# Patient Record
Sex: FEMALE | Race: OTHER | HISPANIC OR LATINO | Employment: UNEMPLOYED | URBAN - METROPOLITAN AREA
[De-identification: names, ages, dates, MRNs, and addresses within clinical notes are randomized per-mention and may not be internally consistent; named-entity substitution may affect disease eponyms.]

---

## 2022-06-06 ENCOUNTER — HOSPITAL ENCOUNTER (EMERGENCY)
Facility: HOSPITAL | Age: 22
Discharge: HOME | End: 2022-06-06
Attending: EMERGENCY MEDICINE | Admitting: EMERGENCY MEDICINE

## 2022-06-06 VITALS
OXYGEN SATURATION: 98 % | RESPIRATION RATE: 16 BRPM | TEMPERATURE: 98.4 F | SYSTOLIC BLOOD PRESSURE: 148 MMHG | WEIGHT: 103.7 LBS | HEART RATE: 95 BPM | DIASTOLIC BLOOD PRESSURE: 73 MMHG

## 2022-06-06 DIAGNOSIS — R19.7 NAUSEA VOMITING AND DIARRHEA: Primary | ICD-10-CM

## 2022-06-06 DIAGNOSIS — R11.2 NAUSEA VOMITING AND DIARRHEA: Primary | ICD-10-CM

## 2022-06-06 PROCEDURE — 99283 EMERGENCY DEPT VISIT LOW MDM: CPT

## 2022-06-06 PROCEDURE — 99284 EMERGENCY DEPT VISIT MOD MDM: CPT | Performed by: EMERGENCY MEDICINE

## 2022-06-06 RX ORDER — ONDANSETRON 4 MG/1
4 TABLET, ORALLY DISINTEGRATING ORAL EVERY 8 HOURS PRN
Qty: 20 TABLET | Refills: 0 | Status: SHIPPED | OUTPATIENT
Start: 2022-06-06

## 2022-06-06 RX ORDER — ONDANSETRON 4 MG/1
4 TABLET, ORALLY DISINTEGRATING ORAL ONCE
Status: COMPLETED | OUTPATIENT
Start: 2022-06-06 | End: 2022-06-06

## 2022-06-06 RX ADMIN — ONDANSETRON 4 MG: 4 TABLET, ORALLY DISINTEGRATING ORAL at 12:36

## 2022-06-06 NOTE — ED PROVIDER NOTES
History  Chief Complaint   Patient presents with    Vomiting     5 days vomiting and diarrhea       History provided by:  Patient  Vomiting  Severity:  Moderate  Timing:  Intermittent  Progression:  Worsening  Recent urination:  Normal  Relieved by:  Nothing  Worsened by:  Nothing  Ineffective treatments:  None tried  Associated symptoms: no abdominal pain, no chills, no cough, no diarrhea, no fever, no headaches, no myalgias and no sore throat        None       History reviewed  No pertinent past medical history  History reviewed  No pertinent surgical history  History reviewed  No pertinent family history  I have reviewed and agree with the history as documented  E-Cigarette/Vaping    E-Cigarette Use Never User      E-Cigarette/Vaping Substances     Social History     Tobacco Use    Smoking status: Never Smoker    Smokeless tobacco: Never Used   Vaping Use    Vaping Use: Never used   Substance Use Topics    Drug use: Never       Review of Systems   Constitutional: Negative for chills and fever  HENT: Negative for rhinorrhea, sore throat and trouble swallowing  Eyes: Negative for pain  Respiratory: Negative for cough, shortness of breath, wheezing and stridor  Cardiovascular: Negative for chest pain and leg swelling  Gastrointestinal: Positive for vomiting  Negative for abdominal pain, diarrhea and nausea  Endocrine: Negative for polyuria  Genitourinary: Negative for dysuria, flank pain and urgency  Musculoskeletal: Negative for joint swelling, myalgias and neck stiffness  Skin: Negative for rash  Allergic/Immunologic: Negative for immunocompromised state  Neurological: Negative for dizziness, syncope, weakness, numbness and headaches  Psychiatric/Behavioral: Negative for confusion and suicidal ideas  All other systems reviewed and are negative  Physical Exam  Physical Exam  Vitals and nursing note reviewed  Constitutional:       Appearance: Normal appearance   She is well-developed  HENT:      Head: Normocephalic and atraumatic  Nose: Nose normal       Mouth/Throat:      Mouth: Mucous membranes are moist    Eyes:      Extraocular Movements: Extraocular movements intact  Pupils: Pupils are equal, round, and reactive to light  Cardiovascular:      Rate and Rhythm: Normal rate and regular rhythm  Heart sounds: No murmur heard  No friction rub  Pulmonary:      Effort: No respiratory distress  Breath sounds: Normal breath sounds  No wheezing or rales  Abdominal:      General: Bowel sounds are normal  There is no distension  Palpations: Abdomen is soft  Tenderness: There is no abdominal tenderness  Musculoskeletal:         General: No tenderness  Normal range of motion  Cervical back: Normal range of motion and neck supple  Skin:     General: Skin is warm  Findings: No rash  Neurological:      Mental Status: She is alert and oriented to person, place, and time  Psychiatric:         Mood and Affect: Mood normal          Vital Signs  ED Triage Vitals [06/06/22 1221]   Temperature Pulse Respirations Blood Pressure SpO2   98 4 °F (36 9 °C) 95 16 148/73 98 %      Temp Source Heart Rate Source Patient Position - Orthostatic VS BP Location FiO2 (%)   Oral Monitor Sitting Left arm --      Pain Score       No Pain           Vitals:    06/06/22 1221   BP: 148/73   Pulse: 95   Patient Position - Orthostatic VS: Sitting         Visual Acuity      ED Medications  Medications   ondansetron (ZOFRAN-ODT) dispersible tablet 4 mg (4 mg Oral Given 6/6/22 1236)       Diagnostic Studies  Results Reviewed     None                 No orders to display              Procedures  Procedures         ED Course         CRAFFT    Flowsheet Row Most Recent Value   SBIRT (13-21 yo)    In order to provide better care to our patients, we are screening all of our patients for alcohol and drug use  Would it be okay to ask you these screening questions?  Yes Filed at: 06/06/2022 1237   [facility] Initial Screen: During the past 12 months, did you:    1  Drink any alcohol (more than a few sips)? No Filed at: 06/06/2022 1237   2  Smoke any marijuana or hashish No Filed at: 06/06/2022 1237   3  Use anything else to get high? ("anything else" includes illegal drugs, over the counter and prescription drugs, and things that you sniff or 'barajas')? No Filed at: 06/06/2022 1237                                          MDM  Number of Diagnoses or Management Options  Nausea vomiting and diarrhea: new and requires workup  Diagnosis management comments: 21F with nausea, vomiting and diarrhea  sxs for several weeks  No abdominal pain as well  No tenderness is noted on exam     No signs of dehydration  Will give zofran and outpatient management and f u     Pt re-examined and evaluated after testing and treatment  Spoke with the patient and feeling improved and sxs have resolved  Will discharge home with close f/u with pcp and instructed to return to the ED if sxs worsen or continue  Pt agrees with the plan for discharge and feels comfortable to go home with proper f/u  Advised to return for worsening or additional problems  Diagnostic tests were reviewed and questions answered  Diagnosis, care plan and treatment options were discussed  The patient understand instructions and will follow up as directed  Counseling: I had a detailed discussion with the patient and/or guardian regarding: the historical points, exam findings, and any diagnostic results supporting the discharge diagnosis, lab results, radiology results, discharge instructions reviewed with patient and/or family/caregiver and understanding was verbalized  Instructions given to return to the emergency department if symptoms worsen or persist, or if there are any questions or concerns that arise at home       All labs reviewed and utilized in the medical decision making process    All radiology studies independently viewed by me and interpreted by the radiologist           Disposition  Final diagnoses:   Nausea vomiting and diarrhea     Time reflects when diagnosis was documented in both MDM as applicable and the Disposition within this note     Time User Action Codes Description Comment    6/6/2022 12:53 PM Raymon Edward Add [R11 2,  R19 7] Nausea vomiting and diarrhea       ED Disposition     ED Disposition   Discharge    Condition   Stable    Date/Time   Mon Jun 6, 2022 1253    Comment   Bartolo Santoyo discharge to home/self care  Follow-up Information    None         Patient's Medications   Discharge Prescriptions    ONDANSETRON (ZOFRAN-ODT) 4 MG DISINTEGRATING TABLET    Take 1 tablet (4 mg total) by mouth every 8 (eight) hours as needed for nausea       Start Date: 6/6/2022  End Date: --       Order Dose: 4 mg       Quantity: 20 tablet    Refills: 0       No discharge procedures on file      PDMP Review     None          ED Provider  Electronically Signed by           Ryann Riggs DO  06/06/22 3967

## 2023-06-06 ENCOUNTER — TELEPHONE (OUTPATIENT)
Age: 23
End: 2023-06-06

## 2023-06-14 NOTE — PROGRESS NOTES
"[address]  Yarelis 23 y o  SUBJECTIVE    CC:  \"I'm pregnant\"    HPI: Nancy Lopez is a 23 y o   female presenting today for evaluation of amenorrhea  She reports her LMP was 23  Her periods are a little irregular - monthly, but will change some weeks  She took a home urine pregnancy test end of May  She reports a little nausea, but no vomiting, denies vaginal bleeding or cramping  She was using nothing for contraception prior to LMP  This was a welcome surprise  OBJECTIVE  Vitals:    06/15/23 1403   BP: 101/70   BP Location: Left arm   Patient Position: Standing   Cuff Size: Adult   Pulse: 100   Resp: 18   Weight: 44 5 kg (98 lb)   Height: 5' 1\" (1 549 m)       Physical Exam  Constitutional:       General: She is not in acute distress  Appearance: She is normal weight  She is not ill-appearing or toxic-appearing  Genitourinary:      Vulva normal    HENT:      Head: Normocephalic and atraumatic  Eyes:      Conjunctiva/sclera: Conjunctivae normal    Cardiovascular:      Pulses: Normal pulses  Pulmonary:      Effort: Pulmonary effort is normal  No respiratory distress  Abdominal:      General: There is no distension  Palpations: Abdomen is soft  Tenderness: There is no abdominal tenderness  There is no guarding or rebound  Musculoskeletal:         General: Normal range of motion  Cervical back: Normal range of motion  Right lower leg: No edema  Left lower leg: No edema  Neurological:      General: No focal deficit present  Mental Status: She is alert  Mental status is at baseline  Skin:     General: Skin is warm and dry  Psychiatric:         Mood and Affect: Mood normal          Behavior: Behavior normal    Exam conducted with a chaperone present  Lab Results:   No visits with results within 1 Day(s) from this visit  Latest known visit with results is:   No results found for any previous visit   " IMPRESSION:     Single intrauterine pregnancy of 8 weeks 1 day gestational age  Fetal cardiac activity detected at 178bpm  No pathologic adnexal masses seen, simple left ovarian cyst noted, 2cm at maximum diameter, likely physiologic              ASSESSMENT    Homa Lomax is a 23 y o   with LMP 23, 9w0d by LMP, with ultrasound showing IUP at 8 weeks 1 days  Per ACOG guidelines, will date by ultrasound today  OMARI 24 by first trimester ultrasound, currently at 8 weeks 1 days    PLAN  1  Discussed expectations for pregnancy, including expectations for prenatal visits and ultrasounds  Reviewed that will have visits both with her Ob and with MFM  Discussed that obstetric services are centered at [facility], and that this is where she should expect to deliver  Reviewed briefly that there is a 24/ help line for emergencies and that [facility] will be able to provide ancillary obstetric services for any other emergency care during this pregnancy  2  Return to care for nursing intake and H&P  Prenatal panel, Hep C, and MFM referral placed        All questions were answered & patient expressed understanding      Patient was discussed with Dr Beronica Gordon MD  OBGYN R-4  6/15/2023

## 2023-06-15 ENCOUNTER — ULTRASOUND (OUTPATIENT)
Dept: OBGYN CLINIC | Facility: CLINIC | Age: 23
End: 2023-06-15

## 2023-06-15 VITALS
HEIGHT: 61 IN | WEIGHT: 98 LBS | DIASTOLIC BLOOD PRESSURE: 70 MMHG | SYSTOLIC BLOOD PRESSURE: 101 MMHG | BODY MASS INDEX: 18.5 KG/M2 | RESPIRATION RATE: 18 BRPM | HEART RATE: 100 BPM

## 2023-06-15 DIAGNOSIS — Z34.90 EARLY STAGE OF PREGNANCY: Primary | ICD-10-CM

## 2023-06-15 PROCEDURE — 76817 TRANSVAGINAL US OBSTETRIC: CPT | Performed by: OBSTETRICS & GYNECOLOGY

## 2023-06-15 PROCEDURE — 99213 OFFICE O/P EST LOW 20 MIN: CPT | Performed by: OBSTETRICS & GYNECOLOGY

## 2023-06-21 ENCOUNTER — APPOINTMENT (OUTPATIENT)
Dept: LAB | Facility: CLINIC | Age: 23
End: 2023-06-21

## 2023-06-21 DIAGNOSIS — Z34.90 EARLY STAGE OF PREGNANCY: ICD-10-CM

## 2023-06-21 LAB
ABO GROUP BLD: NORMAL
AMORPH URATE CRY URNS QL MICRO: ABNORMAL
BACTERIA UR QL AUTO: ABNORMAL /HPF
BASOPHILS # BLD AUTO: 0.06 THOUSANDS/ÂΜL (ref 0–0.1)
BASOPHILS NFR BLD AUTO: 1 % (ref 0–1)
BILIRUB UR QL STRIP: NEGATIVE
BLD GP AB SCN SERPL QL: NEGATIVE
CLARITY UR: ABNORMAL
COLOR UR: YELLOW
EOSINOPHIL # BLD AUTO: 0.07 THOUSAND/ÂΜL (ref 0–0.61)
EOSINOPHIL NFR BLD AUTO: 1 % (ref 0–6)
ERYTHROCYTE [DISTWIDTH] IN BLOOD BY AUTOMATED COUNT: 12 % (ref 11.6–15.1)
GLUCOSE UR STRIP-MCNC: NEGATIVE MG/DL
HCT VFR BLD AUTO: 37.8 % (ref 34.8–46.1)
HGB BLD-MCNC: 12.2 G/DL (ref 11.5–15.4)
HGB UR QL STRIP.AUTO: NEGATIVE
IMM GRANULOCYTES # BLD AUTO: 0.04 THOUSAND/UL (ref 0–0.2)
IMM GRANULOCYTES NFR BLD AUTO: 0 % (ref 0–2)
KETONES UR STRIP-MCNC: ABNORMAL MG/DL
LEUKOCYTE ESTERASE UR QL STRIP: ABNORMAL
LYMPHOCYTES # BLD AUTO: 2.12 THOUSANDS/ÂΜL (ref 0.6–4.47)
LYMPHOCYTES NFR BLD AUTO: 24 % (ref 14–44)
MCH RBC QN AUTO: 31 PG (ref 26.8–34.3)
MCHC RBC AUTO-ENTMCNC: 32.3 G/DL (ref 31.4–37.4)
MCV RBC AUTO: 96 FL (ref 82–98)
MONOCYTES # BLD AUTO: 0.47 THOUSAND/ÂΜL (ref 0.17–1.22)
MONOCYTES NFR BLD AUTO: 5 % (ref 4–12)
MUCOUS THREADS UR QL AUTO: ABNORMAL
NEUTROPHILS # BLD AUTO: 6.27 THOUSANDS/ÂΜL (ref 1.85–7.62)
NEUTS SEG NFR BLD AUTO: 69 % (ref 43–75)
NITRITE UR QL STRIP: NEGATIVE
NON-SQ EPI CELLS URNS QL MICRO: ABNORMAL /HPF
NRBC BLD AUTO-RTO: 0 /100 WBCS
PH UR STRIP.AUTO: 7.5 [PH]
PLATELET # BLD AUTO: 242 THOUSANDS/UL (ref 149–390)
PMV BLD AUTO: 12.5 FL (ref 8.9–12.7)
PROT UR STRIP-MCNC: ABNORMAL MG/DL
RBC # BLD AUTO: 3.94 MILLION/UL (ref 3.81–5.12)
RBC #/AREA URNS AUTO: ABNORMAL /HPF
RH BLD: POSITIVE
SP GR UR STRIP.AUTO: 1.02 (ref 1–1.03)
SPECIMEN EXPIRATION DATE: NORMAL
TREPONEMA PALLIDUM IGG+IGM AB [PRESENCE] IN SERUM OR PLASMA BY IMMUNOASSAY: NORMAL
UROBILINOGEN UR STRIP-ACNC: <2 MG/DL
WBC # BLD AUTO: 9.03 THOUSAND/UL (ref 4.31–10.16)
WBC #/AREA URNS AUTO: ABNORMAL /HPF

## 2023-06-21 PROCEDURE — 36415 COLL VENOUS BLD VENIPUNCTURE: CPT

## 2023-06-21 PROCEDURE — 86850 RBC ANTIBODY SCREEN: CPT

## 2023-06-21 PROCEDURE — 87340 HEPATITIS B SURFACE AG IA: CPT

## 2023-06-21 PROCEDURE — 86803 HEPATITIS C AB TEST: CPT

## 2023-06-21 PROCEDURE — 85025 COMPLETE CBC W/AUTO DIFF WBC: CPT

## 2023-06-21 PROCEDURE — 86762 RUBELLA ANTIBODY: CPT

## 2023-06-21 PROCEDURE — 86780 TREPONEMA PALLIDUM: CPT

## 2023-06-21 PROCEDURE — 86900 BLOOD TYPING SEROLOGIC ABO: CPT

## 2023-06-21 PROCEDURE — 87086 URINE CULTURE/COLONY COUNT: CPT

## 2023-06-21 PROCEDURE — 86706 HEP B SURFACE ANTIBODY: CPT

## 2023-06-21 PROCEDURE — 87389 HIV-1 AG W/HIV-1&-2 AB AG IA: CPT

## 2023-06-21 PROCEDURE — 86901 BLOOD TYPING SEROLOGIC RH(D): CPT

## 2023-06-21 PROCEDURE — 81001 URINALYSIS AUTO W/SCOPE: CPT | Performed by: OBSTETRICS & GYNECOLOGY

## 2023-06-22 LAB
HBV SURFACE AB SER-ACNC: 4.51 MIU/ML
HBV SURFACE AG SER QL: NORMAL
HCV AB SER QL: NORMAL
HIV 1+2 AB+HIV1 P24 AG SERPL QL IA: NORMAL
HIV 2 AB SERPL QL IA: NORMAL
HIV1 AB SERPL QL IA: NORMAL
HIV1 P24 AG SERPL QL IA: NORMAL
RUBV IGG SERPL IA-ACNC: <10 IU/ML

## 2023-06-23 LAB — BACTERIA UR CULT: NORMAL

## 2023-06-29 ENCOUNTER — INITIAL PRENATAL (OUTPATIENT)
Dept: OBGYN CLINIC | Facility: CLINIC | Age: 23
End: 2023-06-29

## 2023-06-29 DIAGNOSIS — Z34.91 PRENATAL CARE IN FIRST TRIMESTER: Primary | ICD-10-CM

## 2023-06-29 PROBLEM — Z28.39 RUBELLA NON-IMMUNE STATUS, ANTEPARTUM: Status: ACTIVE | Noted: 2023-06-29

## 2023-06-29 PROBLEM — O09.899 RUBELLA NON-IMMUNE STATUS, ANTEPARTUM: Status: ACTIVE | Noted: 2023-06-29

## 2023-06-29 PROBLEM — Z78.9 NOT IMMUNE TO HEPATITIS B VIRUS: Status: ACTIVE | Noted: 2023-06-29

## 2023-06-29 NOTE — LETTER
Proof of Pregnancy Letter    Miguel Rivera  2000  [address]  [facility] [address]        06/29/23      Miguel Rivera is a patient at our facility  Bartolo Santoyo Estimated Date of Delivery: 1/24/24       Any questions or concerns, please feel free to contact our office      Sincerely,     [facility]   [address],   [place], [address] [place] [address]   [phone]

## 2023-06-29 NOTE — PROGRESS NOTES
OB INTAKE INTERVIEW  Pt presents for OB intake    OB History    Para Term  AB Living   1             SAB IAB Ectopic Multiple Live Births                  # Outcome Date GA Lbr Dylon/2nd Weight Sex Delivery Anes PTL Lv   1 Current              Hx of  delivery prior to 36 weeks 6 days:  No   If yes, place a referral for cervical surveillance at 16 weeks  Last Menstrual Period:    Patient's last menstrual period was 2023 (exact date)  Ultrasound date: 06/15/2023  8 weeks 1 days     Estimated Date of Delivery: 24   Confirmed by LMP or US    H&P visit scheduled  2023      Last pap smear: na    Findings; lab pap smear results: not done, just turned 23    Current Issues:  Constipation :   No  Headaches :   No  Cramping:  No  Spotting :   No  PICA cravings :  No    FOB Involved:   Yes  Planned pregnancy:  Yes    I have these concerns about this prenatal patient:   Intake done in [address] , written materials provided in [address]  Patient currently on SFS financial plan but will be applying for MA  Interview education    • Baby and Me Handout  • Baby and Me [facility] Handout  • [facility]'s MFM Handout  • Discussed genetic testing  • Prenatal lab work: Scripts printed and given to pt  • Influenza vaccine given today: No  • Discussed Tdap vaccine  Immunizations: There is no immunization history on file for this patient  Depression Screening Follow-up Plan: Patient's depression screening was negative with an [place] score of  2  Clinically patient does not have depression  No treatment is required             Infection Screening: Does the pt have a hx of MRSA? No  If yes- please follow MRSA protocol and obtain a nasal swab for MRSA culture    The patient was oriented to our practice and all questions were answered    Interviewed by: Keiry Robles RN 23

## 2023-06-29 NOTE — LETTER
[address] [facility] KATI Santoyo  2000  [address]  [facility] [address]       06/29/23          Nathalie Mauro is a patient and under our care in our office  Bartolo Santoyo's Estimated Date of Delivery: 1/24/24  Any questions or concerns feel free to contact our office       Thank you,    Jacqueline  [id number] [facility]/Vandana Hernandez 15  [address] [facility]/[place]  [phone]   [facility]/[address]  [phone]

## 2023-06-29 NOTE — LETTER
Work Letter    Jayla Aceves  2000  [address]  [facility] [address]    Dear Jayla Aceves,      06/29/23        Your employee is a patient at [facility]  We recommend that all pregnant women:    1  Have a well-ventilated workspace  2  Wear low-heeled shoes  3  Work no more than 40 hours per week  4  Have a 15 minute break every 2 hours and at least 30 minutes for a meal break  5  Use good body mechanics by bending at your knees to avoid back strain and lift no more than 20 pounds without assistance  Will need assistance with lifting over 20 lbs  6  Have ready access to bathrooms and water  She may continue to work until her due date unless medical complications arise  We anticipate she may return to work in 6-8 weeks after delivery       Sincerely,    [facility]  [address]  [place], [address] [place] [address]  [phone]

## 2023-07-03 ENCOUNTER — PATIENT OUTREACH (OUTPATIENT)
Dept: OBGYN CLINIC | Facility: CLINIC | Age: 23
End: 2023-07-03

## 2023-07-03 NOTE — PROGRESS NOTES
NILESH FRYE spoke with 22 y/o-S-G1-  Kinyarwanda speaking woman for pre severiano assessment. Pt resides with her sister. Pt reported pregnancy was intended and both her and FOB are happy. Couple plans to move in together in the near future. Pt denies any usage of drug, alcohol or smoking. Pt denies any Mental Health or Domestic Violence Hx. Pt is applying for MA and need to call [facility] for appointment. Pt is currently unemployed but denies financial concerns. Pt denies transportation issues. Pt claimed FOB ans extended family are supportive. Pt denies any questions or concerns at this time. NILESH FRYE explained her role ans provided contact information. Pt was encouraged to call at any time needed.

## 2023-07-11 ENCOUNTER — ROUTINE PRENATAL (OUTPATIENT)
Dept: OBGYN CLINIC | Facility: CLINIC | Age: 23
End: 2023-07-11

## 2023-07-11 VITALS
HEART RATE: 81 BPM | WEIGHT: 98 LBS | SYSTOLIC BLOOD PRESSURE: 100 MMHG | RESPIRATION RATE: 18 BRPM | BODY MASS INDEX: 18.5 KG/M2 | HEIGHT: 61 IN | DIASTOLIC BLOOD PRESSURE: 66 MMHG

## 2023-07-11 DIAGNOSIS — Z3A.11 11 WEEKS GESTATION OF PREGNANCY: ICD-10-CM

## 2023-07-11 DIAGNOSIS — R82.90 CONTAMINATION OF URINE CULTURE: ICD-10-CM

## 2023-07-11 DIAGNOSIS — Z34.91 FIRST TRIMESTER PREGNANCY: Primary | ICD-10-CM

## 2023-07-11 PROCEDURE — G0145 SCR C/V CYTO,THINLAYER,RESCR: HCPCS | Performed by: NURSE PRACTITIONER

## 2023-07-11 PROCEDURE — 99214 OFFICE O/P EST MOD 30 MIN: CPT | Performed by: NURSE PRACTITIONER

## 2023-07-11 PROCEDURE — 87491 CHLMYD TRACH DNA AMP PROBE: CPT | Performed by: NURSE PRACTITIONER

## 2023-07-11 PROCEDURE — 87591 N.GONORRHOEAE DNA AMP PROB: CPT | Performed by: NURSE PRACTITIONER

## 2023-07-11 NOTE — PROGRESS NOTES
Prenatal H&P  [facility]    Subjective:  Patient is a  here for initial prenatal H&P. This is an intended pregnancy. Patient reports that her LMP was 23. Patient is currently doing well, just nausea . She is here with  boyfriend. . She has good a support system at home, sister, FOB . She does not have a known family history of inheritable conditions such as physical or intellectual disabilities, birth defects, blood disorders, heart or neural tube defects. She denies recent travel. She denies use of nicotine, EtOH or recreational drug use. OB History    Para Term  AB Living   1             SAB IAB Ectopic Multiple Live Births                  # Outcome Date GA Lbr Dylon/2nd Weight Sex Delivery Anes PTL Lv   1 Current                  Objective:   /66 (BP Location: Right arm, Patient Position: Sitting, Cuff Size: Adult)   Pulse 81   Resp 18   Ht 5' 1" (1.549 m)   Wt 44.5 kg (98 lb)   LMP 2023 (Exact Date)   BMI 18.52 kg/m²     General:   alert and oriented, in no acute distress, alert, appears stated age and cooperative   Heart: regular rate and rhythm, S1, S2 normal, no murmur, click, rub or gallop   Lungs: clear to auscultation bilaterally   Abdomen: soft, non-tender, without masses or organomegaly   Vulva: Bartholin's, Urethra, Ryan's normal   Vagina: normal discharge   Cervix: no cervical motion tenderness   Uterus: size consistent with 12 weeks   Adnexa: no mass, fullness, tenderness         Assessment and Plan:  1. LMP 23 with an OMARI 24. 2. TVUS on 6/15/23 showed EGA 8w1d with an OMARI 24, working OMARI, Jose Rafael Edge today  3. Pap smear 1st pap done today  4. GC/CT collected. 5. Prenatal labs- rubella and hep B non immune, urine culture contaminated >100 K, will repeat. 6. Postpartum: patient plans on  breastfeeding.  Different methods of contraception were discussed with patient, including progesterone only oral pills, depo provera, nexplanon, mirena, and paragard. Patient would like to use- unsure  7. Delivery consent form to be signed at 28 weeks. 8. Sequential screenin2023, level 2 US scheduled 23  9. Labor expectations discussed with patient, including appointment schedule, nutrition, weight gain, sexual intercourse, and nausea and vomiting. 25-35 lbs lbs recommended in pregnancy  10. RTC in 4 weeks.  Continue PNV QD.

## 2023-07-12 LAB
C TRACH DNA SPEC QL NAA+PROBE: NEGATIVE
N GONORRHOEA DNA SPEC QL NAA+PROBE: NEGATIVE

## 2023-07-18 ENCOUNTER — ROUTINE PRENATAL (OUTPATIENT)
Facility: HOSPITAL | Age: 23
End: 2023-07-18

## 2023-07-18 VITALS
HEIGHT: 61 IN | DIASTOLIC BLOOD PRESSURE: 58 MMHG | WEIGHT: 99.4 LBS | HEART RATE: 100 BPM | SYSTOLIC BLOOD PRESSURE: 92 MMHG | BODY MASS INDEX: 18.77 KG/M2

## 2023-07-18 DIAGNOSIS — Z34.90 EARLY STAGE OF PREGNANCY: ICD-10-CM

## 2023-07-18 DIAGNOSIS — Z36.82 ENCOUNTER FOR (NT) NUCHAL TRANSLUCENCY SCAN: ICD-10-CM

## 2023-07-18 DIAGNOSIS — Z3A.12 12 WEEKS GESTATION OF PREGNANCY: Primary | ICD-10-CM

## 2023-07-18 LAB
LAB AP GYN PRIMARY INTERPRETATION: NORMAL
Lab: NORMAL
PATH INTERP SPEC-IMP: NORMAL

## 2023-07-18 PROCEDURE — 76813 OB US NUCHAL MEAS 1 GEST: CPT | Performed by: OBSTETRICS & GYNECOLOGY

## 2023-07-18 PROCEDURE — 99242 OFF/OP CONSLTJ NEW/EST SF 20: CPT | Performed by: OBSTETRICS & GYNECOLOGY

## 2023-07-18 PROCEDURE — 36415 COLL VENOUS BLD VENIPUNCTURE: CPT | Performed by: OBSTETRICS & GYNECOLOGY

## 2023-07-18 NOTE — PROGRESS NOTES
Patient chose to have [facility] Non-invasive Prenatal Screen with fetal sex. Patient given brochure and is aware [facility] will contact their insurance and coordinate coverage. Patient made aware she will need to respond to text message or e-mail from [address] within 2 business days or testing will be run through insurance. Patient informed text message will come from area code  "415". Provided [address] # [phone] and web site : "[email or web address].   "[place] your test online" card with barcode and test tube ID provided to patient. Reviewed [employer]'s web site states 5-7 business days for results via their portal.   [employer] message will be sent to patient when [facility] receives results /provider reviews. 2 vials of blood drawn from left arm by Ananda Morgan MA. Patient tolerated blood draw without difficulty. Specimens labeled with patient identifiers (name, date of birth, specimen collection date), order and specimen were verified with patient, packed and sent via [address]. FED EX  tracking #  [id number]  Copy of lab order scanned to Epic media. Maternal Fetal Medicine will have results in approximately 7-10 business days and will call patient or notify via [address] [place] [address]. Patient aware viewing lab result online will reveal fetal sex if ordered. Patient verbalized understanding of all instructions and no questions at this time.

## 2023-07-18 NOTE — PROGRESS NOTES
[address]: Ms. Alexey Mustafa was seen today at [address] [facility] for nuchal translucency ultrasound. See ultrasound report under "OB Procedures" tab. My recommendations are as follows:  1. We reviewed the availability of aneuploidy screening, as well as diagnostic testing, which are available to all pregnant women. We reviewed limitations, risks, and benefits of screening and testing. She elected to proceed with Non Invasive Prenatal Screening (NIPS). MSAFP screening should be ordered through your office at 15-20 weeks gestation, and completed prior to fetal anatomic survey. She does not wish to pursue diagnostic testing at this time. A detailed anatomic survey as well as transvaginal cervical length screening are recommended between 18-22 weeks gestation.       Please don't hesitate to contact our office with any concerns or questions.    -Madelyn Varma MD

## 2023-07-18 NOTE — LETTER
July 18, 2023     Jeanie Jones MD  [address]  [address] [facility] [address] [address]    Patient: Pinky Dobbs   YOB: 2000   Date of Visit: 7/18/2023       Dear Dr. Anatoliy Bennett:    Thank you for referring Pinky Dobbs to me for evaluation. Below are my notes for this consultation. If you have questions, please do not hesitate to call me. I look forward to following your patient along with you.          Sincerely,        Tami Armas MD        CC: No Recipients

## 2023-07-19 ENCOUNTER — TELEPHONE (OUTPATIENT)
Dept: OBGYN CLINIC | Facility: CLINIC | Age: 23
End: 2023-07-19

## 2023-07-19 NOTE — TELEPHONE ENCOUNTER
----- Message from Ralph Horn, [address] [facility] sent at 7/19/2023  9:54 AM EDT -----  Please Inform Candido who is pregnant that her Pap was negative, it suggests possible yeast and BV please inquire if she has any symptoms and let me know.   Thank you

## 2023-07-19 NOTE — TELEPHONE ENCOUNTER
ID [id number]    Called and spoke to patient, informed her of the results. Patient stated she is experiencing vaginal odor, no itchiness.

## 2023-07-20 DIAGNOSIS — Z34.91 FIRST TRIMESTER PREGNANCY: ICD-10-CM

## 2023-07-20 DIAGNOSIS — N76.0 BACTERIAL VAGINOSIS: Primary | ICD-10-CM

## 2023-07-20 DIAGNOSIS — B96.89 BACTERIAL VAGINOSIS: Primary | ICD-10-CM

## 2023-07-20 RX ORDER — METRONIDAZOLE 500 MG/1
500 TABLET ORAL EVERY 12 HOURS SCHEDULED
Qty: 14 TABLET | Refills: 0 | Status: SHIPPED | OUTPATIENT
Start: 2023-07-20 | End: 2023-07-27

## 2023-08-04 ENCOUNTER — TELEPHONE (OUTPATIENT)
Dept: INTERNAL MEDICINE CLINIC | Facility: CLINIC | Age: 23
End: 2023-08-04

## 2023-08-08 ENCOUNTER — ROUTINE PRENATAL (OUTPATIENT)
Dept: OBGYN CLINIC | Facility: CLINIC | Age: 23
End: 2023-08-08

## 2023-08-08 VITALS
SYSTOLIC BLOOD PRESSURE: 108 MMHG | RESPIRATION RATE: 16 BRPM | BODY MASS INDEX: 18.93 KG/M2 | WEIGHT: 100.2 LBS | DIASTOLIC BLOOD PRESSURE: 68 MMHG | HEART RATE: 92 BPM

## 2023-08-08 DIAGNOSIS — R82.90 ABNORMAL URINE FINDINGS: ICD-10-CM

## 2023-08-08 DIAGNOSIS — Z3A.15 15 WEEKS GESTATION OF PREGNANCY: ICD-10-CM

## 2023-08-08 DIAGNOSIS — Z34.92 SECOND TRIMESTER PREGNANCY: Primary | ICD-10-CM

## 2023-08-08 PROCEDURE — 99213 OFFICE O/P EST LOW 20 MIN: CPT | Performed by: FAMILY MEDICINE

## 2023-08-08 NOTE — PATIENT INSTRUCTIONS
Return for follow-up in 4 weeks. We have ordered 2 tests: a urine culture and an alpha-feto protein (MSAFP) to check for spina bifida in your baby. It is important for you to get your Hepatitis B vaccine as soon as possible.

## 2023-08-08 NOTE — PROGRESS NOTES
[facility]  OB/GYN prenatal visit    S: 26 y.o.  15w6d here for PN visit. She has no complaints. O:  Vitals:    23 1306   BP: 108/68   Pulse: 92   Resp: 16       Gen: no acute distress, nonlabored breathing          A/P:      IUP at 15w6d  · No obstetric complaints today, no genitourinary complaints  · TWG: + .95 kg ( 25-35 lbs recommended in pregnancy)  · Repeat urine culture (last contaminated >100 K)  · MSAFP: parents would like to screen  · NIPS reviewed: negative screen  · Vaccinations: Rubella and Hep non immune. Hep B - referral to FM was placed; reminded patient our recommendations to get this done soon. MMR postpartum.   · Contraception: undecided at this time  Breastfeeding: plans  · Birth plan: primigravid, discussed elective induction  · Discussed  labor precautions and fetal kick counts  · FHT: 150 via TAUS    · Continue PNV  ·  ultrasound scheduled 23  · Return to clinic in 4 weeks      Jaci Goldberg MD  2023  1:44 PM

## 2023-08-09 ENCOUNTER — APPOINTMENT (OUTPATIENT)
Dept: LAB | Facility: CLINIC | Age: 23
End: 2023-08-09
Payer: COMMERCIAL

## 2023-08-09 DIAGNOSIS — Z34.92 SECOND TRIMESTER PREGNANCY: ICD-10-CM

## 2023-08-09 DIAGNOSIS — Z3A.15 15 WEEKS GESTATION OF PREGNANCY: ICD-10-CM

## 2023-08-09 DIAGNOSIS — Z3A.11 11 WEEKS GESTATION OF PREGNANCY: ICD-10-CM

## 2023-08-09 DIAGNOSIS — R82.90 CONTAMINATION OF URINE CULTURE: ICD-10-CM

## 2023-08-09 DIAGNOSIS — R82.90 ABNORMAL URINE FINDINGS: ICD-10-CM

## 2023-08-09 PROCEDURE — 87086 URINE CULTURE/COLONY COUNT: CPT

## 2023-08-09 PROCEDURE — 36415 COLL VENOUS BLD VENIPUNCTURE: CPT

## 2023-08-09 PROCEDURE — 82105 ALPHA-FETOPROTEIN SERUM: CPT

## 2023-08-10 LAB — BACTERIA UR CULT: NORMAL

## 2023-08-11 LAB
2ND TRIMESTER 4 SCREEN SERPL-IMP: NORMAL
AFP ADJ MOM SERPL: 0.59
AFP INTERP AMN-IMP: NORMAL
AFP INTERP SERPL-IMP: NORMAL
AFP INTERP SERPL-IMP: NORMAL
AFP SERPL-MCNC: 66.5 NG/ML
AGE AT DELIVERY: 21.4 YR
GA METHOD: NORMAL
GA: 22.9 WEEKS
IDDM PATIENT QL: NO
MULTIPLE PREGNANCY: NO
NEURAL TUBE DEFECT RISK FETUS: NORMAL %

## 2023-08-14 ENCOUNTER — TELEPHONE (OUTPATIENT)
Dept: OBGYN CLINIC | Facility: CLINIC | Age: 23
End: 2023-08-14

## 2023-08-14 NOTE — TELEPHONE ENCOUNTER
----- Message from Tyrell Mike MD sent at 8/14/2023  8:51 AM EDT -----  MSAFP and repeat ur cx negative. My chart msg sent.

## 2023-08-14 NOTE — TELEPHONE ENCOUNTER
ID [id number]    Attempted to call and spoke to patient, referred her to my chart for recent test results. Office number provided with any questions.

## 2023-08-15 PROCEDURE — 99284 EMERGENCY DEPT VISIT MOD MDM: CPT

## 2023-08-15 PROCEDURE — 99285 EMERGENCY DEPT VISIT HI MDM: CPT | Performed by: EMERGENCY MEDICINE

## 2023-08-16 ENCOUNTER — HOSPITAL ENCOUNTER (EMERGENCY)
Facility: HOSPITAL | Age: 23
Discharge: HOME | End: 2023-08-16
Attending: EMERGENCY MEDICINE
Payer: COMMERCIAL

## 2023-08-16 ENCOUNTER — TELEPHONE (OUTPATIENT)
Dept: OBGYN CLINIC | Facility: CLINIC | Age: 23
End: 2023-08-16

## 2023-08-16 VITALS
DIASTOLIC BLOOD PRESSURE: 67 MMHG | OXYGEN SATURATION: 99 % | TEMPERATURE: 99.1 F | HEART RATE: 99 BPM | RESPIRATION RATE: 16 BRPM | SYSTOLIC BLOOD PRESSURE: 113 MMHG

## 2023-08-16 DIAGNOSIS — R10.13 EPIGASTRIC PAIN: Primary | ICD-10-CM

## 2023-08-16 LAB
ALBUMIN SERPL BCP-MCNC: 3.8 G/DL (ref 3.5–5)
ALP SERPL-CCNC: 45 U/L (ref 34–104)
ALT SERPL W P-5'-P-CCNC: 28 U/L (ref 7–52)
ANION GAP SERPL CALCULATED.3IONS-SCNC: 9 MMOL/L
AST SERPL W P-5'-P-CCNC: 26 U/L (ref 13–39)
ATRIAL RATE: 98 BPM
BASOPHILS # BLD AUTO: 0.05 THOUSANDS/ÂΜL (ref 0–0.1)
BASOPHILS NFR BLD AUTO: 1 % (ref 0–1)
BILIRUB SERPL-MCNC: 0.28 MG/DL (ref 0.2–1)
BUN SERPL-MCNC: 8 MG/DL (ref 5–25)
CALCIUM SERPL-MCNC: 8.8 MG/DL (ref 8.4–10.2)
CARDIAC TROPONIN I PNL SERPL HS: 2 NG/L (ref 8–18)
CHLORIDE SERPL-SCNC: 103 MMOL/L (ref 96–108)
CO2 SERPL-SCNC: 23 MMOL/L (ref 21–32)
CREAT SERPL-MCNC: 0.6 MG/DL (ref 0.6–1.3)
EOSINOPHIL # BLD AUTO: 0.07 THOUSAND/ÂΜL (ref 0–0.61)
EOSINOPHIL NFR BLD AUTO: 1 % (ref 0–6)
ERYTHROCYTE [DISTWIDTH] IN BLOOD BY AUTOMATED COUNT: 12.9 % (ref 11.6–15.1)
GFR SERPL CREATININE-BSD FRML MDRD: 130 ML/MIN/1.73SQ M
GLUCOSE SERPL-MCNC: 113 MG/DL (ref 65–140)
HCT VFR BLD AUTO: 32.9 % (ref 34.8–46.1)
HGB BLD-MCNC: 11.1 G/DL (ref 11.5–15.4)
IMM GRANULOCYTES # BLD AUTO: 0.06 THOUSAND/UL (ref 0–0.2)
IMM GRANULOCYTES NFR BLD AUTO: 1 % (ref 0–2)
LIPASE SERPL-CCNC: 30 U/L (ref 11–82)
LYMPHOCYTES # BLD AUTO: 2.8 THOUSANDS/ÂΜL (ref 0.6–4.47)
LYMPHOCYTES NFR BLD AUTO: 26 % (ref 14–44)
MCH RBC QN AUTO: 31.8 PG (ref 26.8–34.3)
MCHC RBC AUTO-ENTMCNC: 33.7 G/DL (ref 31.4–37.4)
MCV RBC AUTO: 94 FL (ref 82–98)
MONOCYTES # BLD AUTO: 0.77 THOUSAND/ÂΜL (ref 0.17–1.22)
MONOCYTES NFR BLD AUTO: 7 % (ref 4–12)
NEUTROPHILS # BLD AUTO: 7.04 THOUSANDS/ÂΜL (ref 1.85–7.62)
NEUTS SEG NFR BLD AUTO: 64 % (ref 43–75)
NRBC BLD AUTO-RTO: 0 /100 WBCS
P AXIS: 71 DEGREES
PLATELET # BLD AUTO: 208 THOUSANDS/UL (ref 149–390)
PMV BLD AUTO: 11.1 FL (ref 8.9–12.7)
POTASSIUM SERPL-SCNC: 3.1 MMOL/L (ref 3.5–5.3)
PR INTERVAL: 126 MS
PROT SERPL-MCNC: 6.7 G/DL (ref 6.4–8.4)
QRS AXIS: 54 DEGREES
QRSD INTERVAL: 70 MS
QT INTERVAL: 340 MS
QTC INTERVAL: 434 MS
RBC # BLD AUTO: 3.49 MILLION/UL (ref 3.81–5.12)
SODIUM SERPL-SCNC: 135 MMOL/L (ref 135–147)
T WAVE AXIS: 38 DEGREES
VENTRICULAR RATE: 98 BPM
WBC # BLD AUTO: 10.79 THOUSAND/UL (ref 4.31–10.16)

## 2023-08-16 PROCEDURE — 93010 ELECTROCARDIOGRAM REPORT: CPT | Performed by: INTERNAL MEDICINE

## 2023-08-16 PROCEDURE — 36415 COLL VENOUS BLD VENIPUNCTURE: CPT | Performed by: EMERGENCY MEDICINE

## 2023-08-16 PROCEDURE — 83690 ASSAY OF LIPASE: CPT | Performed by: EMERGENCY MEDICINE

## 2023-08-16 PROCEDURE — C9113 INJ PANTOPRAZOLE SODIUM, VIA: HCPCS | Performed by: EMERGENCY MEDICINE

## 2023-08-16 PROCEDURE — 93005 ELECTROCARDIOGRAM TRACING: CPT

## 2023-08-16 PROCEDURE — 85025 COMPLETE CBC W/AUTO DIFF WBC: CPT | Performed by: EMERGENCY MEDICINE

## 2023-08-16 PROCEDURE — 84484 ASSAY OF TROPONIN QUANT: CPT | Performed by: EMERGENCY MEDICINE

## 2023-08-16 PROCEDURE — 80053 COMPREHEN METABOLIC PANEL: CPT | Performed by: EMERGENCY MEDICINE

## 2023-08-16 PROCEDURE — 96374 THER/PROPH/DIAG INJ IV PUSH: CPT

## 2023-08-16 RX ORDER — MAGNESIUM HYDROXIDE/ALUMINUM HYDROXICE/SIMETHICONE 120; 1200; 1200 MG/30ML; MG/30ML; MG/30ML
30 SUSPENSION ORAL ONCE
Status: COMPLETED | OUTPATIENT
Start: 2023-08-16 | End: 2023-08-16

## 2023-08-16 RX ORDER — POTASSIUM CHLORIDE 20 MEQ/1
40 TABLET, EXTENDED RELEASE ORAL ONCE
Status: COMPLETED | OUTPATIENT
Start: 2023-08-16 | End: 2023-08-16

## 2023-08-16 RX ORDER — PANTOPRAZOLE SODIUM 40 MG/10ML
40 INJECTION, POWDER, LYOPHILIZED, FOR SOLUTION INTRAVENOUS ONCE
Status: COMPLETED | OUTPATIENT
Start: 2023-08-16 | End: 2023-08-16

## 2023-08-16 RX ORDER — PANTOPRAZOLE SODIUM 40 MG/1
40 TABLET, DELAYED RELEASE ORAL DAILY
Qty: 10 TABLET | Refills: 0 | Status: SHIPPED | OUTPATIENT
Start: 2023-08-16 | End: 2023-08-26

## 2023-08-16 RX ADMIN — PANTOPRAZOLE SODIUM 40 MG: 40 INJECTION, POWDER, FOR SOLUTION INTRAVENOUS at 00:21

## 2023-08-16 RX ADMIN — POTASSIUM CHLORIDE 40 MEQ: 1500 TABLET, EXTENDED RELEASE ORAL at 01:16

## 2023-08-16 RX ADMIN — ALUMINUM HYDROXIDE, MAGNESIUM HYDROXIDE, AND DIMETHICONE 30 ML: 200; 20; 200 SUSPENSION ORAL at 00:21

## 2023-08-16 NOTE — TELEPHONE ENCOUNTER
ID [id number]    Called and spoke to patient, informed her of the test results. Patient verbalized understanding, no questions or concerns.

## 2023-08-16 NOTE — TELEPHONE ENCOUNTER
----- Message from Janey Reyna, [address] [facility] sent at 8/16/2023  8:53 AM EDT -----  Please inform pt that her urine culture was negative.   Thanks

## 2023-08-19 NOTE — ED ATTENDING ATTESTATION
8/15/2023  June Elizalde DO, saw and evaluated the patient. I have discussed the patient with the resident/non-physician practitioner and agree with the resident's/non-physician practitioner's findings, Plan of Care, and MDM as documented in the resident's/non-physician practitioner's note, except where noted. All available labs and Radiology studies were reviewed. I was present for key portions of any procedure(s) performed by the resident/non-physician practitioner and I was immediately available to provide assistance. At this point I agree with the current assessment done in the Emergency Department.   I have conducted an independent evaluation of this patient a history and physical is as follows:    ED Course         Critical Care Time  Procedures

## 2023-09-04 PROBLEM — Z3A.19 19 WEEKS GESTATION OF PREGNANCY: Status: ACTIVE | Noted: 2023-07-11

## 2023-09-04 NOTE — PROGRESS NOTES
[facility]  OB/GYN prenatal visit    S: 26 y.o.  19w5d here for PN visit. She has no obstetric complaints, including pelvic pain, contractions, vaginal bleeding, loss of fluid, or decreased fetal movement. She reports she had some cramping last week does not have currently. May have been dehydrated, reviewed water intake and to increase. She was seen in ER on 8/15/2023 for chest pain/epigastric pain was treated with Protonix, was given a prescription for home but patient did not fill, reports she does not want to take it and that she did not need it anymore. Did review over-the-counter medications if needed and to call with any concerns.   She was also given potassium in the ER due to her level being 3.1, also reviewed increase her diet with foods that are high in potassium    O:  Vitals:    23 1001   BP: 95/64   Pulse: 76   Resp: 18       Gen: no acute distress, nonlabored breathing  Fundal Height (cm): 21 cm  Fetal Heart Rate: 147    A/P:      IUP at 19w5d  No obstetric complaints today  TWG: + 5 lbs 14.3 oz  ( 25-35 lbs recommended in pregnancy)  US 23- level 2 scheduled for 23  NIPT negative  MSAFP- negative  Discussed  labor precautions and fetal kick counts    Return to clinic in 4 weeks      Problem List        Other    Rubella non-immune status, antepartum    Overview     Noted on prenatal labs  Needs MMR postpartum         Not immune to hepatitis B virus    Overview     Noted on prenatal labs, vaccine series recommended to be obtained through PCP         Second trimester pregnancy    19 weeks gestation of pregnancy    Overview      pregravid BMI 18.55-    25-35 lbs recommended in pregnancy  Rubella non immune,   Hepatitis B non immune  Urine culture contaminated repeat done 23- negative  NIPT negative, MSAFP negative  US 23- level 2 scheduled for 23  Contraception- unsure  Covid vaccines-x2  Contraception-unsure         Abnormal urine findings Overview     6/21/13 mixed contaminants >100,000 urine culture  7/11/13 ordered but pt did not complete  8/8/13 seen for PN and ordered urine culture- negative                  WANDY Ho  9/5/2023  10:39 AM

## 2023-09-05 ENCOUNTER — ROUTINE PRENATAL (OUTPATIENT)
Dept: OBGYN CLINIC | Facility: CLINIC | Age: 23
End: 2023-09-05

## 2023-09-05 VITALS
WEIGHT: 104 LBS | BODY MASS INDEX: 19.63 KG/M2 | DIASTOLIC BLOOD PRESSURE: 64 MMHG | RESPIRATION RATE: 18 BRPM | SYSTOLIC BLOOD PRESSURE: 95 MMHG | HEIGHT: 61 IN | HEART RATE: 76 BPM

## 2023-09-05 DIAGNOSIS — Z34.92 SECOND TRIMESTER PREGNANCY: Primary | ICD-10-CM

## 2023-09-05 DIAGNOSIS — Z3A.19 19 WEEKS GESTATION OF PREGNANCY: ICD-10-CM

## 2023-09-06 ENCOUNTER — ROUTINE PRENATAL (OUTPATIENT)
Facility: HOSPITAL | Age: 23
End: 2023-09-06
Payer: COMMERCIAL

## 2023-09-06 VITALS
DIASTOLIC BLOOD PRESSURE: 58 MMHG | WEIGHT: 104.72 LBS | HEART RATE: 83 BPM | SYSTOLIC BLOOD PRESSURE: 102 MMHG | HEIGHT: 61 IN | BODY MASS INDEX: 19.77 KG/M2

## 2023-09-06 DIAGNOSIS — Z36.86 ENCOUNTER FOR ANTENATAL SCREENING FOR CERVICAL LENGTH: ICD-10-CM

## 2023-09-06 DIAGNOSIS — Z3A.20 20 WEEKS GESTATION OF PREGNANCY: Primary | ICD-10-CM

## 2023-09-06 DIAGNOSIS — Z36.3 ENCOUNTER FOR ANTENATAL SCREENING FOR MALFORMATION: ICD-10-CM

## 2023-09-06 PROCEDURE — 76805 OB US >/= 14 WKS SNGL FETUS: CPT | Performed by: OBSTETRICS & GYNECOLOGY

## 2023-09-06 PROCEDURE — 76817 TRANSVAGINAL US OBSTETRIC: CPT | Performed by: OBSTETRICS & GYNECOLOGY

## 2023-09-06 NOTE — PROGRESS NOTES
Ultrasound Probe Disinfection    A transvaginal ultrasound was performed. Prior to use, disinfection was performed with High Level Disinfection Process ([employer]). Probe serial number [id number]: [id number] was used.       Keerthi oVra  09/06/23  1:32 PM

## 2023-09-06 NOTE — PATIENT INSTRUCTIONS
Thank you for choosing us for your  care today. If you have any questions about your ultrasound or care, please do not hesitate to contact us or your primary obstetrician. Some general instructions for your pregnancy are:    Protect against coronavirus: get vaccinated - pregnant women are increased risk of severe COVID. Notify your primary care doctor if you have any symptoms. Exercise: Aim for 22 minutes per day (150 minutes per week) of regular exercise. Walking is great! Nutrition: aim for calcium-rich and iron-rich foods as well as healthy sources of protein. Learn about Preeclampsia: preeclampsia is a common, serious high blood pressure complication in pregnancy. A blood pressure of 840CDSJ (systolic or top number) or 97YLZB (diastolic or bottom number) is not normal and needs evaluation by your doctor. Aspirin is sometimes prescribed in early pregnancy to prevent preeclampsia in women with risk factors - ask your obstetrician if you should be on this medication. If you smoke, try to reduce how many cigarettes you smoke or try to quit completely. Do not vape. Other warning signs to watch out for in pregnancy or postpartum: chest pain, obstructed breathing or shortness of breath, seizures, thoughts of hurting yourself or your baby, bleeding, a painful or swollen leg, fever, or headache (see [facility] Sign[facility]). If these happen call 911. Itching is also not normal in pregnancy and if you experience this, especially over your hands and feet, potentially worse at night, notify your doctors.

## 2023-09-06 NOTE — PROGRESS NOTES
[address]: [address] was seen today for anatomic survey and cervical length screening ultrasound. See ultrasound report under "OB Procedures" tab.    Please don't hesitate to contact our office with any concerns or questions.  -Katherine Merida MD

## 2023-10-03 ENCOUNTER — ROUTINE PRENATAL (OUTPATIENT)
Dept: OBGYN CLINIC | Facility: CLINIC | Age: 23
End: 2023-10-03

## 2023-10-03 VITALS
HEART RATE: 74 BPM | BODY MASS INDEX: 21.52 KG/M2 | HEIGHT: 61 IN | RESPIRATION RATE: 18 BRPM | DIASTOLIC BLOOD PRESSURE: 61 MMHG | WEIGHT: 114 LBS | SYSTOLIC BLOOD PRESSURE: 96 MMHG

## 2023-10-03 DIAGNOSIS — Z3A.23 23 WEEKS GESTATION OF PREGNANCY: ICD-10-CM

## 2023-10-03 DIAGNOSIS — Z34.92 SECOND TRIMESTER PREGNANCY: Primary | ICD-10-CM

## 2023-10-03 PROCEDURE — 99213 OFFICE O/P EST LOW 20 MIN: CPT | Performed by: NURSE PRACTITIONER

## 2023-10-03 NOTE — PROGRESS NOTES
[facility]  OB/GYN prenatal visit    S: 26 y.o.  23w6d here for PN visit. She has no obstetric complaints, including pelvic pain, contractions, vaginal bleeding, loss of fluid, or decreased fetal movement.       O:  Vitals:    10/03/23 1006   BP: 96/61   Pulse: 74   Resp: 18       Gen: no acute distress, nonlabored breathing  Fundal Height (cm): 25 cm  Fetal Heart Rate: 144    A/P:      IUP at 23w6d  No obstetric complaints today  TWG: + 15 lbs 14.3 ( 25-35 lbs  Recommended in pregnancy)  US 23-US 10/6/23 to complete anatomy survey  To complete 28 wk labs a few days prior to her next appointment, B+ blood type  Vaccinations: Declines flu vaccine today, information provided  Contraception: condoms  Breastfeeding: yes  Discussed  labor precautions and fetal kick counts    Return to clinic in 4 weeks    Problem List        Other    Rubella non-immune status, antepartum    Overview     Noted on prenatal labs  Needs MMR postpartum         Not immune to hepatitis B virus    Overview     Noted on prenatal labs, vaccine series recommended to be obtained through PCP         Second trimester pregnancy    23 weeks gestation of pregnancy    Overview      pregravid BMI 18.55-    25-35 lbs recommended in pregnancy  Rubella non immune,   Hepatitis B non immune   Urine culture contaminated repeat done 23- negative  NIPT negative, MSAFP negative  US 23- level 2 scheduled for 23- US 10/6/23 to complete anatomy scan  breastfeeding  Contraception- condoms  Covid vaccines-x2           Abnormal urine findings    Overview     13 mixed contaminants >100,000 urine culture  13 ordered but pt did not complete  13 seen for PN and ordered urine culture- negative                  WANDY Toledo  10/3/2023  10:52 AM

## 2023-10-06 ENCOUNTER — ULTRASOUND (OUTPATIENT)
Dept: PERINATAL CARE | Facility: CLINIC | Age: 23
End: 2023-10-06
Payer: COMMERCIAL

## 2023-10-06 VITALS
WEIGHT: 112.8 LBS | SYSTOLIC BLOOD PRESSURE: 95 MMHG | DIASTOLIC BLOOD PRESSURE: 60 MMHG | HEIGHT: 61 IN | BODY MASS INDEX: 21.3 KG/M2 | HEART RATE: 105 BPM

## 2023-10-06 DIAGNOSIS — Z3A.24 24 WEEKS GESTATION OF PREGNANCY: ICD-10-CM

## 2023-10-06 DIAGNOSIS — Z36.2 ENCOUNTER FOR FOLLOW-UP ULTRASOUND OF FETAL ANATOMY: Primary | ICD-10-CM

## 2023-10-06 PROCEDURE — 76816 OB US FOLLOW-UP PER FETUS: CPT | Performed by: OBSTETRICS & GYNECOLOGY

## 2023-10-06 NOTE — PATIENT INSTRUCTIONS
Thank you for choosing us for your  care today. If you have any questions about your ultrasound or care, please do not hesitate to contact us or your primary obstetrician. Some general instructions for your pregnancy are:    Exercise: Aim for 22 minutes per day (150 minutes per week) of regular exercise. Walking is great! Nutrition: Choose healthy sources of calcium, iron, and protein. Learn about Preeclampsia: preeclampsia is a common, serious high blood pressure complication in pregnancy. A blood pressure of 064YGWQ (systolic or top number) or 32AKCN (diastolic or bottom number) is not normal and needs evaluation by your doctor. Aspirin is sometimes prescribed in early pregnancy to prevent preeclampsia in women with risk factors - ask your obstetrician if you should be on this medication. For more resources, visit:  [employer]  If you smoke, try to reduce how many cigarettes you smoke or try to quit completely. Do not vape. Other warning signs to watch out for in pregnancy or postpartum: chest pain, obstructed breathing or shortness of breath, seizures, thoughts of hurting yourself or your baby, bleeding, a painful or swollen leg, fever, or headache (see [facility] POST-BIRTH Warning Signs campaign). If these happen call 911. Itching is also not normal in pregnancy and if you experience this, especially over your hands and feet, potentially worse at night, notify your doctors.

## 2023-10-06 NOTE — PROGRESS NOTES
[id number]  [facility]: Ms. Julio C Dutta was seen today for followup missed anatomy ultrasound. See ultrasound report under "OB Procedures" tab. Please don't hesitate to contact our office with any concerns or questions.   Bruce Whiting MD

## 2023-10-27 ENCOUNTER — APPOINTMENT (OUTPATIENT)
Dept: LAB | Facility: CLINIC | Age: 23
End: 2023-10-27
Payer: COMMERCIAL

## 2023-10-27 DIAGNOSIS — Z34.92 SECOND TRIMESTER PREGNANCY: ICD-10-CM

## 2023-10-27 LAB
BASOPHILS # BLD AUTO: 0.07 THOUSANDS/ÂΜL (ref 0–0.1)
BASOPHILS NFR BLD AUTO: 1 % (ref 0–1)
EOSINOPHIL # BLD AUTO: 0.13 THOUSAND/ÂΜL (ref 0–0.61)
EOSINOPHIL NFR BLD AUTO: 1 % (ref 0–6)
ERYTHROCYTE [DISTWIDTH] IN BLOOD BY AUTOMATED COUNT: 12.1 % (ref 11.6–15.1)
HCT VFR BLD AUTO: 32.9 % (ref 34.8–46.1)
HGB BLD-MCNC: 10.8 G/DL (ref 11.5–15.4)
IMM GRANULOCYTES # BLD AUTO: 0.13 THOUSAND/UL (ref 0–0.2)
IMM GRANULOCYTES NFR BLD AUTO: 1 % (ref 0–2)
LYMPHOCYTES # BLD AUTO: 2.59 THOUSANDS/ÂΜL (ref 0.6–4.47)
LYMPHOCYTES NFR BLD AUTO: 24 % (ref 14–44)
MCH RBC QN AUTO: 32.2 PG (ref 26.8–34.3)
MCHC RBC AUTO-ENTMCNC: 32.8 G/DL (ref 31.4–37.4)
MCV RBC AUTO: 98 FL (ref 82–98)
MONOCYTES # BLD AUTO: 0.83 THOUSAND/ÂΜL (ref 0.17–1.22)
MONOCYTES NFR BLD AUTO: 8 % (ref 4–12)
NEUTROPHILS # BLD AUTO: 7.08 THOUSANDS/ÂΜL (ref 1.85–7.62)
NEUTS SEG NFR BLD AUTO: 65 % (ref 43–75)
NRBC BLD AUTO-RTO: 0 /100 WBCS
PLATELET # BLD AUTO: 233 THOUSANDS/UL (ref 149–390)
PMV BLD AUTO: 11.5 FL (ref 8.9–12.7)
RBC # BLD AUTO: 3.35 MILLION/UL (ref 3.81–5.12)
TREPONEMA PALLIDUM IGG+IGM AB [PRESENCE] IN SERUM OR PLASMA BY IMMUNOASSAY: NORMAL
WBC # BLD AUTO: 10.83 THOUSAND/UL (ref 4.31–10.16)

## 2023-10-27 PROCEDURE — 86780 TREPONEMA PALLIDUM: CPT

## 2023-10-27 PROCEDURE — 36415 COLL VENOUS BLD VENIPUNCTURE: CPT

## 2023-10-30 ENCOUNTER — APPOINTMENT (OUTPATIENT)
Dept: LAB | Facility: CLINIC | Age: 23
End: 2023-10-30
Payer: COMMERCIAL

## 2023-10-30 LAB — GLUCOSE 1H P 50 G GLC PO SERPL-MCNC: 103 MG/DL (ref 40–134)

## 2023-10-31 ENCOUNTER — ROUTINE PRENATAL (OUTPATIENT)
Dept: OBGYN CLINIC | Facility: CLINIC | Age: 23
End: 2023-10-31

## 2023-10-31 VITALS
HEIGHT: 61 IN | BODY MASS INDEX: 22.28 KG/M2 | SYSTOLIC BLOOD PRESSURE: 95 MMHG | WEIGHT: 118 LBS | HEART RATE: 84 BPM | DIASTOLIC BLOOD PRESSURE: 61 MMHG | RESPIRATION RATE: 18 BRPM

## 2023-10-31 DIAGNOSIS — Z3A.27 27 WEEKS GESTATION OF PREGNANCY: ICD-10-CM

## 2023-10-31 DIAGNOSIS — O99.013 ANEMIA DURING PREGNANCY IN THIRD TRIMESTER: ICD-10-CM

## 2023-10-31 DIAGNOSIS — Z34.92 SECOND TRIMESTER PREGNANCY: Primary | ICD-10-CM

## 2023-10-31 RX ORDER — FERROUS SULFATE 324(65)MG
324 TABLET, DELAYED RELEASE (ENTERIC COATED) ORAL
Qty: 30 TABLET | Refills: 2 | Status: SHIPPED | OUTPATIENT
Start: 2023-10-31 | End: 2024-01-31

## 2023-10-31 RX ORDER — DOCUSATE SODIUM 100 MG/1
100 CAPSULE, LIQUID FILLED ORAL 2 TIMES DAILY
Qty: 60 CAPSULE | Refills: 2 | Status: SHIPPED | OUTPATIENT
Start: 2023-10-31 | End: 2024-01-31

## 2023-10-31 NOTE — PROGRESS NOTES
[facility]  OB/GYN prenatal visit    S: 26 y.o.  27w6d here for PN visit. She has no obstetric complaints, including pelvic pain, contractions, vaginal bleeding, loss of fluid, or decreased fetal movement. O:  Vitals:    10/31/23 0957   BP: 95/61   Pulse: 84   Resp: 18       Gen: no acute distress, nonlabored breathing  Fundal Height (cm): 28 cm  Fetal Heart Rate: 144    A/P:      IUP at 27w6d  No obstetric complaints today  TWG: + 19lbs 14.3    US 10/6/23, VTX,   10/30/23   1 hr , hgb 10.8, will start on iron supplements  Vaccinations: flu and Tdap next visit  Reviewed RSV vaccine and information provided  Delivery consent signed today  Discussed  labor precautions and fetal kick counts    Return to clinic in 2 weeks    Problem List          Other    Rubella non-immune status, antepartum    Overview     Noted on prenatal labs  Needs MMR postpartum         Not immune to hepatitis B virus    Overview     Noted on prenatal labs, vaccine series recommended to be obtained through PCP         Second trimester pregnancy    27 weeks gestation of pregnancy    Overview      pregravid BMI 18.55-    25-35 lbs recommended in pregnancy  Rubella non immune,   Hepatitis B non immune   Urine culture contaminated repeat done 23- negative  NIPT negative, MSAFP negative  US 23- level 2 scheduled for 23- US 10/6/23 to complete anatomy scan, US 10/30/23, VTX no further US  Delivery consent signed 10/31/23  breastfeeding  Contraception- condoms  Covid vaccines-x2  Flu and Tdap vaccine next visit. Reviewed RSV vaccine ( 32-36 wks) information provided.            Abnormal urine findings    Overview     23 mixed contaminants >100,000 urine culture  23 ordered but pt did not complete  23 seen for PN and ordered urine culture- negative           Anemia during pregnancy in third trimester    Overview     Lab Results   Component Value Date    HGB 10.8 (L) 10/27/2023     Will start on iron supplements once a day and colace             WANDY Mir  10/31/2023  10:31 AM

## 2023-11-14 ENCOUNTER — ROUTINE PRENATAL (OUTPATIENT)
Dept: OBGYN CLINIC | Facility: CLINIC | Age: 23
End: 2023-11-14

## 2023-11-14 VITALS
SYSTOLIC BLOOD PRESSURE: 100 MMHG | BODY MASS INDEX: 22.84 KG/M2 | HEIGHT: 61 IN | DIASTOLIC BLOOD PRESSURE: 75 MMHG | RESPIRATION RATE: 18 BRPM | WEIGHT: 121 LBS | HEART RATE: 80 BPM

## 2023-11-14 DIAGNOSIS — Z23 ENCOUNTER FOR IMMUNIZATION: Primary | ICD-10-CM

## 2023-11-14 PROBLEM — Z3A.29 29 WEEKS GESTATION OF PREGNANCY: Status: ACTIVE | Noted: 2023-07-11

## 2023-11-14 PROCEDURE — 90471 IMMUNIZATION ADMIN: CPT | Performed by: OBSTETRICS & GYNECOLOGY

## 2023-11-14 PROCEDURE — 99213 OFFICE O/P EST LOW 20 MIN: CPT | Performed by: OBSTETRICS & GYNECOLOGY

## 2023-11-14 PROCEDURE — 90715 TDAP VACCINE 7 YRS/> IM: CPT | Performed by: OBSTETRICS & GYNECOLOGY

## 2023-11-14 NOTE — PROGRESS NOTES
[address] [facility]  [id number]  2000        A/P:    Problem List          Other    Rubella non-immune status, antepartum    Overview     Noted on prenatal labs  Needs MMR postpartum         Not immune to hepatitis B virus    Overview     Noted on prenatal labs, vaccine series recommended to be obtained through PCP         Second trimester pregnancy    29 weeks gestation of pregnancy    Overview     Pregravid BMI 18.55-    25-35 lbs recommended in pregnancy  Rubella non immune,   Hepatitis B non immune   Urine culture contaminated repeat done 23- negative  NIPT negative, MSAFP negative  US 23- level 2 scheduled for 23- US 10/6/23 to complete anatomy scan, US 10/30/23, VTX no further US  Delivery consent signed 10/31/23  1h GTT wnl  breastfeeding  Contraception- condoms, information on different forms provided  Covid vaccines-x2  Declined Flu, Tdap given 23  Reviewed RSV vaccine ( 32-36 wks) information provided           Abnormal urine findings    Overview     23 mixed contaminants >100,000 urine culture  23 ordered but pt did not complete  23 seen for PN and ordered urine culture- negative           Anemia during pregnancy in third trimester    Overview     Lab Results   Component Value Date    HGB 10.8 (L) 10/27/2023     Will start on iron supplements once a day and colace          Other Visit Diagnoses       Encounter for immunization    -  Primary             S: 26 y.o.  29w6d here for PN visit. She has no contractions. She has no leakage of fluid and vaginal bleeding. She has good fetal movement. O:  Vitals:    23 1147   BP: 100/75   Pulse: 80   Resp: 18     Physical Exam  HENT:      Head: Normocephalic. Eyes:      Conjunctiva/sclera: Conjunctivae normal.   Cardiovascular:      Rate and Rhythm: Normal rate. Pulses: Normal pulses.    Pulmonary:      Effort: Pulmonary effort is normal.   Abdominal: Tenderness: There is no abdominal tenderness. Neurological:      Mental Status: She is alert and oriented to person, place, and time.    Psychiatric:         Mood and Affect: Mood normal.         Fetal Heart Rate: 150    D/w Dr. Reeta Meckel, MD  PGY-3  11/14/2023  11:51 AM

## 2023-11-28 ENCOUNTER — ROUTINE PRENATAL (OUTPATIENT)
Dept: OBGYN CLINIC | Facility: CLINIC | Age: 23
End: 2023-11-28

## 2023-11-28 VITALS
RESPIRATION RATE: 16 BRPM | WEIGHT: 120.8 LBS | SYSTOLIC BLOOD PRESSURE: 112 MMHG | BODY MASS INDEX: 22.82 KG/M2 | HEART RATE: 100 BPM | DIASTOLIC BLOOD PRESSURE: 74 MMHG

## 2023-11-28 DIAGNOSIS — Z34.03 SUPERVISION OF NORMAL FIRST PREGNANCY IN THIRD TRIMESTER: ICD-10-CM

## 2023-11-28 DIAGNOSIS — O09.899 RUBELLA NON-IMMUNE STATUS, ANTEPARTUM: Primary | ICD-10-CM

## 2023-11-28 DIAGNOSIS — Z28.39 RUBELLA NON-IMMUNE STATUS, ANTEPARTUM: Primary | ICD-10-CM

## 2023-11-28 DIAGNOSIS — Z28.39 SUSCEPTIBLE TO VARICELLA (NON-IMMUNE), CURRENTLY PREGNANT IN THIRD TRIMESTER: ICD-10-CM

## 2023-11-28 DIAGNOSIS — O09.893 SUSCEPTIBLE TO VARICELLA (NON-IMMUNE), CURRENTLY PREGNANT IN THIRD TRIMESTER: ICD-10-CM

## 2023-11-28 DIAGNOSIS — Z3A.31 31 WEEKS GESTATION OF PREGNANCY: ICD-10-CM

## 2023-11-28 DIAGNOSIS — R82.90 ABNORMAL URINE FINDINGS: ICD-10-CM

## 2023-11-28 DIAGNOSIS — Z78.9 NOT IMMUNE TO HEPATITIS B VIRUS: ICD-10-CM

## 2023-11-28 DIAGNOSIS — O99.013 ANEMIA DURING PREGNANCY IN THIRD TRIMESTER: ICD-10-CM

## 2023-11-28 PROBLEM — Z34.93 THIRD TRIMESTER PREGNANCY: Status: ACTIVE | Noted: 2023-07-11

## 2023-11-28 NOTE — PROGRESS NOTES
PRENATAL VISIT  28-36 WEEKS  Haily Antonio  2023  12:47 PM  Dr. Agata Quick MD      Assessment/Plan     26 y.o.,  with OMARI of 2024, by Ultrasound.  by doppler on today's visit. Pregnancy Plan:  Pregnancy: Bowens     Delivery Plans  Planned delivery method: Vaginal  Planned delivery location: [facility] L[facility]  Planned anesthesia: Epidural  Acceptable blood products: All     Post-Delivery Plans  Feeding intentions: Breast Milk    28 Week Labs:  CBC with platelets:   Lab Results   Component Value Date/Time    HGB 10.8 (L) 10/27/2023 11:24 AM    HGB 11.1 (L) 2023 12:20 AM     10/27/2023 11:24 AM     2023 12:20 AM     RPR: non-reactive  1 hour Glucose:   Lab Results   Component Value Date/Time    [id number] 103 10/30/2023 11:05 AM       Prenatal Labs:  Blood type:   Lab Results   Component Value Date/Time    ABO B 2023 11:35 AM    RH Positive 2023 11:35 AM     HIV: negative  Hepatitis B:   Lab Results   Component Value Date/Time    HEPBSAG Non-reactive 2023 11:35 AM     Rubella:   Lab Results   Component Value Date/Time    RUBELLAIGGQT <10.0 (L) 2023 11:35 AM     Varicella: No results found for: "VARICELLAIGG"  Gonorrhea/Chlamydia: Negative  Pap smear: Negative 2023   UA and Urine culture: 2023 Negative    Level 1 US: 10/06/2023. Vertex presentation, Anterior placenta  Level 2 US: 2023 Bowens IUP No placenta previa     1. Pregnancy: GBS sample will be taken at 36 weeks     2. Counseling: Encouraged patient to call office if she experiences a gush of fluids, vaginal bleeding, a decrease in fetal movement, or more than 4 contractions in 1 hour. Patient should be monitoring kick counts (more than 10 movements in 2 hours). Discussed different options for birth control after delivery including Nuva ring, mini pill, IUD, and nexplanon. Encourage patient to research pediatricians if they have not already picked one. 3. Immunizations:   Flu shot: declines at this time "sister in law go sick during pregnant after vaccination. Tdap (27-36w) 11/14/2023    4. Follow up: RTO in 2 weeks    Visits q2 wk up to 36w       Problem List          Other    Rubella non-immune status, antepartum    Overview     Noted on prenatal labs  Needs MMR postpartum         Not immune to hepatitis B virus    Overview     Noted on prenatal labs, vaccine series recommended to be obtained through PCP         Third trimester pregnancy    31 weeks gestation of pregnancy    Overview     Pregravid BMI 18.55-    25-35 lbs recommended in pregnancy  Rubella non immune,   Hepatitis B non immune   Varicella questionable will need AB testing  Urine culture contaminated repeat done 8/9/23- negative  NIPT negative, MSAFP negative  US 7/18/23- level 2 scheduled for 9/6/23- US 10/6/23 to complete anatomy scan, US 10/30/23, VTX no further US  Delivery consent signed 10/31/23  1h GTT wnl  breastfeeding  Contraception- condoms, information on different forms provided  Covid vaccines-x2  Declined Flu, Tdap given 11/14/23  Reviewed RSV vaccine ( 32-36 wks) information provided           Abnormal urine findings    Overview     6/21/23 mixed contaminants >100,000 urine culture  7/11/23 ordered but pt did not complete  8/8/23 seen for PN and ordered urine culture- negative           Anemia during pregnancy in third trimester    Overview     Lab Results   Component Value Date    HGB 10.8 (L) 10/27/2023   Patient encouraged to continue iron supplements once a day and colace          Other Visit Diagnoses       Supervision of normal first pregnancy in third trimester        Susceptible to Varicella (non-immune), currently pregnant in third trimester        Unknown immunity. Will need Varicella antibody testing. ------------------------------------------------------------------------------------------------------------------------------------------------------------------------------------------------------------------------------------------------------    SUBJECTIVE    Patient is a  at [address] here for her prenatal visits. She is currently doing well. She complains of  abdominal pain/pressure especially on laying down flat and prolonged walking. She denies vaginal bleeding, cramping, leakage, and abnormal discharge. She reports good fetal movement. Review of Systems   Constitutional:  Negative for chills and fever. HENT:  Negative for ear pain and sore throat. Eyes:  Negative for pain and visual disturbance. Respiratory:  Negative for cough, shortness of breath and wheezing. Cardiovascular:  Negative for chest pain and palpitations. Gastrointestinal:  Positive for abdominal pain (intermittent with activity and laying flat. ). Negative for vomiting. Endocrine: Negative for polyuria. Genitourinary:  Negative for decreased urine volume, difficulty urinating, dysuria, enuresis, flank pain, frequency, hematuria, urgency, vaginal bleeding and vaginal discharge. Musculoskeletal:  Negative for arthralgias and back pain. Skin:  Negative for color change and rash. Neurological:  Negative for dizziness, tremors, seizures, syncope, weakness, light-headedness, numbness and headaches. Psychiatric/Behavioral:  Negative for agitation and dysphoric mood. The patient is not nervous/anxious. All other systems reviewed and are negative. Patient's last menstrual period was 2023 (exact date).   OB History    Para Term  AB Living   1 0 0 0 0 0   SAB IAB Ectopic Multiple Live Births   0 0 0 0 0      # Outcome Date GA Lbr Dylon/2nd Weight Sex Delivery Anes PTL Lv   1 Current __________________________________________________________________________________________________________________________________________________    OBJECTIVE  Vitals:    11/28/23 1059   BP: 112/74   Pulse: 100   Resp: 16       Physical Exam  Constitutional:       General: She is not in acute distress. Appearance: Normal appearance. She is not ill-appearing. HENT:      Mouth/Throat:      Mouth: Mucous membranes are moist.   Eyes:      General: No scleral icterus. Conjunctiva/sclera: Conjunctivae normal.      Pupils: Pupils are equal, round, and reactive to light. Cardiovascular:      Rate and Rhythm: Normal rate and regular rhythm. Pulses: Normal pulses. Pulmonary:      Effort: Pulmonary effort is normal.      Breath sounds: Normal breath sounds. Abdominal:      General: Bowel sounds are normal. There is no distension. Palpations: Abdomen is soft. Tenderness: There is no abdominal tenderness. There is no right CVA tenderness, left CVA tenderness, guarding or rebound. Neurological:      Mental Status: She is alert and oriented to person, place, and time. Mental status is at baseline. Skin:     General: Skin is warm. Capillary Refill: Capillary refill takes less than 2 seconds.    Psychiatric:         Mood and Affect: Mood normal.         Behavior: Behavior normal.

## 2023-12-10 PROBLEM — Z3A.33 33 WEEKS GESTATION OF PREGNANCY: Status: ACTIVE | Noted: 2023-07-11

## 2023-12-10 NOTE — PROGRESS NOTES
[address] [facility]  [id number]  2000        ASSESSMENT/PLAN:  Problem List          Other    Rubella non-immune status, antepartum    Overview     Noted on prenatal labs  Needs MMR postpartum         Not immune to hepatitis B virus    Overview     Noted on prenatal labs, vaccine series recommended to be obtained through PCP         Third trimester pregnancy    33 weeks gestation of pregnancy    Overview     Pregravid BMI 18.55-    25-35 lbs recommended in pregnancy  Rubella non immune,   Hepatitis B non immune   Varicella questionable will need AB testing  Urine culture contaminated repeat done 23- negative  NIPT negative, MSAFP negative  US 23- level 2 scheduled for 23- US 10/6/23 to complete anatomy scan, US 10/30/23, VTX no further US  Delivery consent signed 10/31/23  1h GTT wnl  breastfeeding  Contraception- condoms, information on different forms provided  Covid vaccines-x2  Declined Flu, Tdap given 23  Reviewed RSV vaccine ( 32-36 wks) information provided           Abnormal urine findings    Overview     23 mixed contaminants >100,000 urine culture  23 ordered but pt did not complete  23 seen for PN and ordered urine culture- negative           Anemia during pregnancy in third trimester    Overview     Lab Results   Component Value Date    HGB 10.8 (L) 10/27/2023   Patient encouraged to continue iron supplements once a day and colace              Subjective: 26 y.o.  33w4d here for prenatal visit. She denies contractions. She denies leakage of fluid and vaginal bleeding. She endorses good fetal movement. Patient has no questions at this time.      Objective:  Pre- Vitals      Flowsheet Row Most Recent Value   Prenatal Assessment    Prenatal Vitals    Blood Pressure 97/60   Weight - Scale 56.7 kg (125 lb)   Urine Albumin/Glucose    Dilation/Effacement/Station    Vaginal Drainage    Edema              Pregnancy Plan:  Pregnancy: Bowens     Delivery Plans  Planned delivery method: Vaginal  Planned delivery location: [facility] L[facility]D  Planned anesthesia: Epidural  Acceptable blood products: All     Post-Delivery Plans  Feeding intentions: Breast Milk      General: Well appearing, no distress  Respiratory: Unlabored breathing  Cardiovascular: Regular rate. FHR: 140bpm  Abdomen: Soft, gravid, nontender  Fundal Height: Appropriate for gestational age. 33 cm  Extremities: Warm and well perfused. Non tender.         D/w Dr. Betzy Carpenter MD  OBGYN PGY-2  12/12/2023 11:48 AM

## 2023-12-12 ENCOUNTER — ROUTINE PRENATAL (OUTPATIENT)
Dept: OBGYN CLINIC | Facility: CLINIC | Age: 23
End: 2023-12-12

## 2023-12-12 VITALS
WEIGHT: 125 LBS | RESPIRATION RATE: 18 BRPM | BODY MASS INDEX: 23.6 KG/M2 | SYSTOLIC BLOOD PRESSURE: 97 MMHG | DIASTOLIC BLOOD PRESSURE: 60 MMHG | HEART RATE: 96 BPM | HEIGHT: 61 IN

## 2023-12-12 DIAGNOSIS — Z3A.33 33 WEEKS GESTATION OF PREGNANCY: Primary | ICD-10-CM

## 2023-12-19 ENCOUNTER — TELEPHONE (OUTPATIENT)
Dept: OBGYN CLINIC | Facility: CLINIC | Age: 23
End: 2023-12-19

## 2023-12-19 NOTE — TELEPHONE ENCOUNTER
"Pt calls with complaints of \"menstrual like pain, that started 20 mins ago.\" Rates pain 2/10. Patient denies LOF, or bleeding, and states baby is moving as normal. Advised pt to keep monitoring and if worsens/does not improve to call back. Pt agreeable with plan.   "

## 2023-12-22 ENCOUNTER — TELEPHONE (OUTPATIENT)
Dept: OBGYN CLINIC | Facility: CLINIC | Age: 23
End: 2023-12-22

## 2023-12-22 NOTE — TELEPHONE ENCOUNTER
Pt called reporting swelling of hands and minor pain. No swelling elsewhere, no headache, dizziness, or blurred vision. Pt is feeling baby move as normal. Advised patient to get evaluated at L&D if pain worsens or those symptoms develop.

## 2023-12-27 ENCOUNTER — ROUTINE PRENATAL (OUTPATIENT)
Dept: OBGYN CLINIC | Facility: CLINIC | Age: 23
End: 2023-12-27

## 2023-12-27 VITALS
DIASTOLIC BLOOD PRESSURE: 64 MMHG | BODY MASS INDEX: 24.09 KG/M2 | RESPIRATION RATE: 18 BRPM | WEIGHT: 127.6 LBS | HEART RATE: 92 BPM | SYSTOLIC BLOOD PRESSURE: 99 MMHG | HEIGHT: 61 IN

## 2023-12-27 DIAGNOSIS — Z3A.36 36 WEEKS GESTATION OF PREGNANCY: ICD-10-CM

## 2023-12-27 DIAGNOSIS — Z34.93 THIRD TRIMESTER PREGNANCY: Primary | ICD-10-CM

## 2023-12-27 PROCEDURE — 99213 OFFICE O/P EST LOW 20 MIN: CPT | Performed by: OBSTETRICS & GYNECOLOGY

## 2023-12-27 PROCEDURE — 87150 DNA/RNA AMPLIFIED PROBE: CPT

## 2023-12-27 NOTE — PROGRESS NOTES
"Brett Antonio is a 23 y.o. female being seen today for her obstetrical visit. She is at 36w0d gestation. Patient reports  mild itching and wrists pain and numbness likely carpel tunnel symptoms  . Fetal movement: normal.    Menstrual History:  OB History          1    Para        Term                AB        Living             SAB        IAB        Ectopic        Multiple        Live Births                      Patient's last menstrual period was 2023 (exact date).       The following portions of the patient's history were reviewed and updated as appropriate: allergies, current medications, past family history, past medical history, past social history, past surgical history, and problem list.    Review of Systems  Respiratory: negative  Cardiovascular: negative  Gastrointestinal: negative  Genitourinary:negative     Objective     BP 99/64 (BP Location: Right arm, Patient Position: Sitting, Cuff Size: Adult)   Pulse 92   Resp 18   Ht 5' 1\" (1.549 m)   Wt 57.9 kg (127 lb 9.6 oz)   LMP 2023 (Exact Date)   BMI 24.11 kg/m²   FHT:  141 BPM   Uterine Size: 36 cm       Assessment    IUP at 36w    Plan  No obstetric complains  Received tdap vaccine, decline flu shot   28-week labs reviewed, anemia, continue iron supplement  Infant feeding: plans to breastfeed.  Maternity leave: discussed.  Cigarette smoking: never smoked.  Follow up in 1 Week.        "

## 2023-12-29 ENCOUNTER — HOSPITAL ENCOUNTER (OUTPATIENT)
Facility: HOSPITAL | Age: 23
Discharge: HOME | End: 2023-12-29
Attending: OBSTETRICS & GYNECOLOGY | Admitting: STUDENT IN AN ORGANIZED HEALTH CARE EDUCATION/TRAINING PROGRAM
Payer: COMMERCIAL

## 2023-12-29 VITALS
DIASTOLIC BLOOD PRESSURE: 66 MMHG | TEMPERATURE: 98.5 F | OXYGEN SATURATION: 100 % | RESPIRATION RATE: 16 BRPM | SYSTOLIC BLOOD PRESSURE: 108 MMHG | HEART RATE: 87 BPM

## 2023-12-29 PROBLEM — Z3A.36 36 WEEKS GESTATION OF PREGNANCY: Status: ACTIVE | Noted: 2023-07-11

## 2023-12-29 LAB
ERYTHROCYTE [DISTWIDTH] IN BLOOD BY AUTOMATED COUNT: 12.5 % (ref 11.6–15.1)
GP B STREP DNA SPEC QL NAA+PROBE: NEGATIVE
HCT VFR BLD AUTO: 34 % (ref 34.8–46.1)
HGB BLD-MCNC: 11.1 G/DL (ref 11.5–15.4)
MCH RBC QN AUTO: 31 PG (ref 26.8–34.3)
MCHC RBC AUTO-ENTMCNC: 32.6 G/DL (ref 31.4–37.4)
MCV RBC AUTO: 95 FL (ref 82–98)
PLATELET # BLD AUTO: 248 THOUSANDS/UL (ref 149–390)
PMV BLD AUTO: 10.6 FL (ref 8.9–12.7)
RBC # BLD AUTO: 3.58 MILLION/UL (ref 3.81–5.12)
WBC # BLD AUTO: 10.58 THOUSAND/UL (ref 4.31–10.16)

## 2023-12-29 PROCEDURE — 85027 COMPLETE CBC AUTOMATED: CPT

## 2023-12-29 PROCEDURE — 99213 OFFICE O/P EST LOW 20 MIN: CPT

## 2023-12-29 RX ADMIN — SODIUM CHLORIDE, SODIUM LACTATE, POTASSIUM CHLORIDE, AND CALCIUM CHLORIDE 1000 ML: .6; .31; .03; .02 INJECTION, SOLUTION INTRAVENOUS at 16:26

## 2023-12-29 NOTE — PROGRESS NOTES
L&D Triage Note - OB/GYN  Bartolo Antonio 23 y.o. female MRN: [id number]  Unit/Bed#: LD TRIAGE  Encounter: [id number]      ASSESSMENT:    Bartolo Antonio is a 23 y.o.  at 36w2d presents after a gush of green fluid. ROM workup is negative. Patient had a a near vasovagal episode while in triage. Symptoms improved after juice and a fluid bolus. Patient is suitable for discharge home with precautions.    PLAN:    1) increased vaginal discharge   -microscopy: negative   -SSE: negative pooling, nitrazine negative    2) near vasovagal/tachycardia   -improved after fluid bolus and juice    3) Continue routine prenatal care  4) Discharge from OB triage with  labor precautions    - Reviewed rupture of membranes, false vs true labor, decreased fetal movement, and vaginal bleeding   - Pt to call provider with any concerns and follow up at her next scheduled prenatal appointment    - Case discussed with Dr. Cordon and Dr. Metcalf    SUBJECTIVE:    Bartolo Antonio 23 y.o.  at 36w2d with an Estimated Date of Delivery: 24 She comes in after having a gush of green fluid earlier this afternoon. She has not had any leaking since. When initially presenting to triage, she said she was very hot and nervous. She had a near vasovagal event while in the bed. Symptoms improved after reclining the bed and giving juice. She felt much better moments after. Cold packs given to her as well. She is not having any contractions, denies vaginal bleeding, endorses good fetal movement.    Her current obstetrical history is significant for rubella non-immune    OBJECTIVE:    Vitals:   HR: initially tachycardic 110s-130s, improved 90s-100s before discharge  BP: 100s-120s/50-60s  O2: 100%        ROS:  Constitutional: Negative  Respiratory: Negative  Cardiovascular: Negative    Gastrointestinal: Negative    General Physical Exam:  General: non-toxic, alert, and cooperative  Cardiovascular: Cor RRR  Lungs:  non-labored breathing  Abdomen: abdomen is soft without significant tenderness, masses, organomegaly or guarding  Lower extremeties: nontender    Cervical Exam  Speculum: negative pooling, cervical appears closed, physiologic discharge  SVE: 0 / 0% / -4    Fetal monitoring:  FHT:  140 bpm/ Moderate 6 - 25 bpm / 15 x 15 accelerations present, no decelerations  [place]: irritable, mild     KOH/WTMT:     Infection:   - neg clue cells    - neg hyphae   - neg trichomonads present    Membrane status   - neg ferning   - neg nitrazene   - neg pooling     Jabier Paz MD  OBGYN PGY-2  12/29/2023 5:36 PM

## 2024-01-05 ENCOUNTER — ROUTINE PRENATAL (OUTPATIENT)
Dept: OBGYN CLINIC | Facility: CLINIC | Age: 24
End: 2024-01-05

## 2024-01-05 VITALS
RESPIRATION RATE: 18 BRPM | HEIGHT: 61 IN | WEIGHT: 127 LBS | SYSTOLIC BLOOD PRESSURE: 95 MMHG | BODY MASS INDEX: 23.98 KG/M2 | DIASTOLIC BLOOD PRESSURE: 65 MMHG | HEART RATE: 105 BPM

## 2024-01-05 DIAGNOSIS — Z34.93 PRENATAL CARE IN THIRD TRIMESTER: ICD-10-CM

## 2024-01-05 DIAGNOSIS — Z3A.37 37 WEEKS GESTATION OF PREGNANCY: Primary | ICD-10-CM

## 2024-01-05 PROCEDURE — 99213 OFFICE O/P EST LOW 20 MIN: CPT | Performed by: OBSTETRICS & GYNECOLOGY

## 2024-01-05 NOTE — PROGRESS NOTES
"Bartolo Antonio presents today for routine OB visit at 37w2d.  Blood Pressure: 95/65  Wt=57.6 kg (127 lb); Body mass index is 24 kg/m².; TWG=13.1 kg (28 lb 14.3 oz)  BPM    ;    Abdomen: gravid, soft, non-tender.    She reports no new complaints today.  She does report recent visit to  [facility]/L&D triage after having a \"gush of green fluid\"  for which her workup was unremarkable.  She was discharged in stable condition.  Reported experiencing vasovagal/syncopal episode due to feeling extremely hot when in ED.  Says symptom resolved rapidly (20-30 seconds later).    Currently, she denies any residual symptoms today.      Denies uterine contractions.  Denies vaginal bleeding or leaking of fluid.  Reports adequate fetal movement of at least 10 movements in 2 hours once daily.    Scheduled for ultrasound .  Reviewed premature labor precautions and fetal kick counts.  Advised to continue medications and return in 1 week.     37 weeks gestation of pregnancy         Current Outpatient Medications   Medication Instructions    docusate sodium (COLACE) 100 mg, Oral, 2 times daily    ferrous sulfate 324 mg, Oral, Daily before breakfast    ondansetron (ZOFRAN-ODT) 4 mg, Oral, Every 8 hours PRN    pantoprazole (PROTONIX) 40 mg, Oral, Daily    Prenatal Vit-Fe Fumarate-FA (PRENATAL 1+1 PO) Oral         Pregnancy Problems (from 06/15/23 to present)       Problem Noted Resolved    Third trimester pregnancy 7/11/2023 by WANDY Hobbs No    37 weeks gestation of pregnancy 7/11/2023 by WANDY Hobbs No    Overview Addendum 11/28/2023 12:41 PM by Hawk Gordon MD     Pregravid BMI 18.55-    25-35 lbs recommended in pregnancy  Rubella non immune,   Hepatitis B non immune   Varicella questionable will need AB testing  Urine culture contaminated repeat done 8/9/23- negative  NIPT negative, MSAFP negative  US 7/18/23- level 2 scheduled for 9/6/23- US 10/6/23 to complete anatomy scan, US 10/30/23, " VTX no further US  Delivery consent signed 10/31/23  1h GTT wnl  breastfeeding  Contraception- condoms, information on different forms provided  Covid vaccines-x2  Declined Flu, Tdap given 11/14/23  Reviewed RSV vaccine ( 32-36 wks) information provided

## 2024-01-12 ENCOUNTER — ROUTINE PRENATAL (OUTPATIENT)
Dept: OBGYN CLINIC | Facility: CLINIC | Age: 24
End: 2024-01-12

## 2024-01-12 VITALS
HEART RATE: 76 BPM | HEIGHT: 61 IN | WEIGHT: 129 LBS | SYSTOLIC BLOOD PRESSURE: 95 MMHG | RESPIRATION RATE: 18 BRPM | DIASTOLIC BLOOD PRESSURE: 63 MMHG | BODY MASS INDEX: 24.35 KG/M2

## 2024-01-12 DIAGNOSIS — Z34.93 PRENATAL CARE IN THIRD TRIMESTER: Primary | ICD-10-CM

## 2024-01-12 DIAGNOSIS — Z3A.38 38 WEEKS GESTATION OF PREGNANCY: ICD-10-CM

## 2024-01-12 PROBLEM — R82.90 ABNORMAL URINE FINDINGS: Status: RESOLVED | Noted: 2023-08-08 | Resolved: 2024-01-12

## 2024-01-12 PROCEDURE — 99213 OFFICE O/P EST LOW 20 MIN: CPT | Performed by: OBSTETRICS & GYNECOLOGY

## 2024-01-12 NOTE — PROGRESS NOTES
Bartolo Antonio presents today for routine OB visit at 38w2d.  Blood Pressure: 95/63  Wt=58.5 kg (129 lb); Body mass index is 24.37 kg/m².; TWG=14 kg (30 lb 14.3 oz)   ;      Abdomen: gravid, soft, non-tender.  She reports no new complaints.  Denies regular uterine contractions.  Denies vaginal bleeding or leaking of fluid.  Reports adequate fetal movement of at least 10 movements in 2 hours once daily.  Reviewed labor precautions and fetal kick counts as well as pre-eclampsia warning signs.  Reviewed labor precautions,  Will consider IOL at next visit.  Advised to continue medications and return in 1 week.      Current Outpatient Medications   Medication Instructions    docusate sodium (COLACE) 100 mg, Oral, 2 times daily    ferrous sulfate 324 mg, Oral, Daily before breakfast    ondansetron (ZOFRAN-ODT) 4 mg, Oral, Every 8 hours PRN    pantoprazole (PROTONIX) 40 mg, Oral, Daily    Prenatal Vit-Fe Fumarate-FA (PRENATAL 1+1 PO) Oral         Pregnancy Problems (from 06/15/23 to present)       Problem Noted Resolved    Third trimester pregnancy 7/11/2023 by WANDY Hobbs No    38 weeks gestation of pregnancy 7/11/2023 by WANDY Hobbs No    Overview Addendum 11/28/2023 12:41 PM by Hawk Gordon MD     Pregravid BMI 18.55-    25-35 lbs recommended in pregnancy  Rubella non immune,   Hepatitis B non immune   Varicella questionable will need AB testing  Urine culture contaminated repeat done 8/9/23- negative  NIPT negative, MSAFP negative  US 7/18/23- level 2 scheduled for 9/6/23- US 10/6/23 to complete anatomy scan, US 10/30/23, VTX no further US  Delivery consent signed 10/31/23  1h GTT wnl  breastfeeding  Contraception- condoms, information on different forms provided  Covid vaccines-x2  Declined Flu, Tdap given 11/14/23  Reviewed RSV vaccine ( 32-36 wks) information provided

## 2024-01-19 ENCOUNTER — ROUTINE PRENATAL (OUTPATIENT)
Dept: OBGYN CLINIC | Facility: CLINIC | Age: 24
End: 2024-01-19

## 2024-01-19 VITALS
DIASTOLIC BLOOD PRESSURE: 63 MMHG | HEART RATE: 78 BPM | SYSTOLIC BLOOD PRESSURE: 99 MMHG | BODY MASS INDEX: 24.73 KG/M2 | RESPIRATION RATE: 18 BRPM | HEIGHT: 61 IN | WEIGHT: 131 LBS

## 2024-01-19 DIAGNOSIS — Z3A.39 39 WEEKS GESTATION OF PREGNANCY: ICD-10-CM

## 2024-01-19 DIAGNOSIS — Z34.03 PRENATAL CARE, FIRST PREGNANCY IN THIRD TRIMESTER: Primary | ICD-10-CM

## 2024-01-19 PROCEDURE — 99213 OFFICE O/P EST LOW 20 MIN: CPT | Performed by: OBSTETRICS & GYNECOLOGY

## 2024-01-19 NOTE — PROGRESS NOTES
Bartolo Antonio presents today for routine OB visit at 39w2d.  Blood Pressure: 99/63  Wt=59.4 kg (131 lb); Body mass index is 24.75 kg/m².; TWG=14.9 kg (32 lb 14.3 oz) fundal height 34 cm   ;    Abdomen: gravid, soft, non-tender.  She reports and instance of discharge of mucus yesterday without blood, which was self limited.  Denies uterine contractions.  Denies vaginal bleeding or leaking of fluid aside from mentioned above.   Reports adequate fetal movement of at least 10 movements in 2 hours once daily.  Scheduled for ultrasound n/a.  Reviewed premature labor precautions and fetal kick counts.  Advised to continue medications and return in 1 weeks.  Labor precautions discussed, including large or persistent discharge of fluid, bleeding, or frequent contractions <5 min apart for 1 hour.       Current Outpatient Medications   Medication Instructions    docusate sodium (COLACE) 100 mg, Oral, 2 times daily    ferrous sulfate 324 mg, Oral, Daily before breakfast    ondansetron (ZOFRAN-ODT) 4 mg, Oral, Every 8 hours PRN    pantoprazole (PROTONIX) 40 mg, Oral, Daily    Prenatal Vit-Fe Fumarate-FA (PRENATAL 1+1 PO) Oral         Pregnancy Problems (from 06/15/23 to present)       Problem Noted Resolved    Third trimester pregnancy 7/11/2023 by WANDY Hobbs No    38 weeks gestation of pregnancy 7/11/2023 by WANDY Hobbs No    Overview Addendum 11/28/2023 12:41 PM by Hawk Gordon MD     Pregravid BMI 18.55-    25-35 lbs recommended in pregnancy  Rubella non immune,   Hepatitis B non immune   Varicella questionable will need AB testing  Urine culture contaminated repeat done 8/9/23- negative  NIPT negative, MSAFP negative  US 7/18/23- level 2 scheduled for 9/6/23- US 10/6/23 to complete anatomy scan, US 10/30/23, VTX no further US  Delivery consent signed 10/31/23  1h GTT wnl  breastfeeding  Contraception- condoms, information on different forms provided  Covid  vaccines-x2  Declined Flu, Tdap given 11/14/23  Reviewed RSV vaccine ( 32-36 wks) information provided

## 2024-01-26 ENCOUNTER — ROUTINE PRENATAL (OUTPATIENT)
Dept: OBGYN CLINIC | Facility: CLINIC | Age: 24
End: 2024-01-26

## 2024-01-26 VITALS
SYSTOLIC BLOOD PRESSURE: 102 MMHG | HEIGHT: 61 IN | HEART RATE: 85 BPM | WEIGHT: 133 LBS | BODY MASS INDEX: 25.11 KG/M2 | RESPIRATION RATE: 18 BRPM | DIASTOLIC BLOOD PRESSURE: 72 MMHG

## 2024-01-26 DIAGNOSIS — Z3A.40 40 WEEKS GESTATION OF PREGNANCY: Primary | ICD-10-CM

## 2024-01-26 DIAGNOSIS — Z34.93 PRENATAL CARE IN THIRD TRIMESTER: ICD-10-CM

## 2024-01-26 PROCEDURE — 99213 OFFICE O/P EST LOW 20 MIN: CPT | Performed by: OBSTETRICS & GYNECOLOGY

## 2024-01-26 NOTE — PROGRESS NOTES
[facility]   PRENATAL VISIT  Bartolo Antonio  [id number]  2000        ASSESSMENT/PLAN:  Problem List       Rubella non-immune status, antepartum    Overview     Noted on prenatal labs  Needs MMR postpartum         Not immune to hepatitis B virus    Overview     Noted on prenatal labs, vaccine series recommended to be obtained through PCP         Third trimester pregnancy    40 weeks gestation of pregnancy    Overview     Pregravid BMI 18.55-    25-35 lbs recommended in pregnancy  Rubella non immune,   Hepatitis B non immune   Varicella questionable will need AB testing  Urine culture contaminated repeat done 23- negative  NIPT negative, MSAFP negative  US 23- level 2 scheduled for 23- US 10/6/23 to complete anatomy scan, US 10/30/23, VTX no further US  Delivery consent signed 10/31/23  1h GTT wnl  breastfeeding  Contraception- condoms, information on different forms provided  Covid vaccines-x2  Declined Flu, Tdap given 23  Reviewed RSV vaccine ( 32-36 wks) information provided  Elective IOL due to post-term signed. Patient scheduled IOL on  at 8pm         Anemia during pregnancy in third trimester    Overview     Lab Results   Component Value Date    HGB 10.8 (L) 10/27/2023   Patient encouraged to continue iron supplements once a day and colace          Other Visit Diagnoses       Prenatal care in third trimester                    Subjective: 23 y.o.  40w2d here for prenatal visit. She denies contractions. She denies leakage of fluid and vaginal bleeding. She endorses good fetal movement. Reports no other complaints. Reports taking prenatal vitamin.     Objective:  Pre-Jodi Vitals      Flowsheet Row Most Recent Value   Prenatal Assessment    Fetal Heart Rate 125   Fundal Height (cm) 41 cm   Prenatal Vitals    Blood Pressure 102/72   Weight - Scale 60.3 kg (133 lb)   Urine Albumin/Glucose    Dilation/Effacement/Station    Cervical Dilation 1   Cervical  Effacement 70   Fetal Station -3   Vaginal Drainage    Edema              Pregnancy Plan:  Pregnancy: [place]     Delivery Plans  Planned delivery method: Vaginal  Planned delivery location: [facility] L[facility]  Planned anesthesia: Epidural  Acceptable blood products: All     Post-Delivery Plans  Feeding intentions: Breast Milk      General: Well appearing, no distress  Respiratory: Unlabored breathing  Cardiovascular: Regular rate.  Abdomen: Soft, gravid, nontender  Fundal Height: Appropriate for gestational age.  Extremities: Warm and well perfused.  Non tender.        D/w  Episcopkonrad Lopez D.O.  Family Medicine Resident PGY-2

## 2024-01-27 ENCOUNTER — HOSPITAL ENCOUNTER (OUTPATIENT)
Dept: LABOR AND DELIVERY | Facility: HOSPITAL | Age: 24
Discharge: HOME | DRG: 560 | End: 2024-01-27
Payer: COMMERCIAL

## 2024-01-27 ENCOUNTER — HOSPITAL ENCOUNTER (INPATIENT)
Facility: HOSPITAL | Age: 24
LOS: 3 days | Discharge: HOME | DRG: 560 | End: 2024-01-30
Attending: OBSTETRICS & GYNECOLOGY | Admitting: OBSTETRICS & GYNECOLOGY
Payer: COMMERCIAL

## 2024-01-27 DIAGNOSIS — Z3A.40 40 WEEKS GESTATION OF PREGNANCY: ICD-10-CM

## 2024-01-27 PROBLEM — Z34.90 ENCOUNTER FOR INDUCTION OF LABOR: Status: ACTIVE | Noted: 2024-01-27

## 2024-01-27 PROBLEM — J06.9 UPPER RESPIRATORY INFECTION: Status: ACTIVE | Noted: 2024-01-27

## 2024-01-27 PROBLEM — O99.013 ANEMIA DURING PREGNANCY IN THIRD TRIMESTER: Status: RESOLVED | Noted: 2023-10-31 | Resolved: 2024-01-27

## 2024-01-27 LAB
ABO GROUP BLD: NORMAL
BLD GP AB SCN SERPL QL: NEGATIVE
ERYTHROCYTE [DISTWIDTH] IN BLOOD BY AUTOMATED COUNT: 13.4 % (ref 11.6–15.1)
FLUAV RNA RESP QL NAA+PROBE: POSITIVE
FLUBV RNA RESP QL NAA+PROBE: NEGATIVE
HCT VFR BLD AUTO: 35.1 % (ref 34.8–46.1)
HGB BLD-MCNC: 11.6 G/DL (ref 11.5–15.4)
HOLD SPECIMEN: NORMAL
MCH RBC QN AUTO: 30.5 PG (ref 26.8–34.3)
MCHC RBC AUTO-ENTMCNC: 33 G/DL (ref 31.4–37.4)
MCV RBC AUTO: 92 FL (ref 82–98)
PLATELET # BLD AUTO: 212 THOUSANDS/UL (ref 149–390)
PMV BLD AUTO: 10.8 FL (ref 8.9–12.7)
RBC # BLD AUTO: 3.8 MILLION/UL (ref 3.81–5.12)
RH BLD: POSITIVE
RSV RNA RESP QL NAA+PROBE: NEGATIVE
SARS-COV-2 RNA RESP QL NAA+PROBE: NEGATIVE
SPECIMEN EXPIRATION DATE: NORMAL
WBC # BLD AUTO: 9.8 THOUSAND/UL (ref 4.31–10.16)

## 2024-01-27 PROCEDURE — 86780 TREPONEMA PALLIDUM: CPT

## 2024-01-27 PROCEDURE — 0241U HB NFCT DS VIR RESP RNA 4 TRGT: CPT

## 2024-01-27 PROCEDURE — 4A1HXCZ MONITORING OF PRODUCTS OF CONCEPTION, CARDIAC RATE, EXTERNAL APPROACH: ICD-10-PCS | Performed by: OBSTETRICS & GYNECOLOGY

## 2024-01-27 PROCEDURE — 85027 COMPLETE CBC AUTOMATED: CPT

## 2024-01-27 PROCEDURE — 86901 BLOOD TYPING SEROLOGIC RH(D): CPT

## 2024-01-27 PROCEDURE — 86850 RBC ANTIBODY SCREEN: CPT

## 2024-01-27 PROCEDURE — 93005 ELECTROCARDIOGRAM TRACING: CPT

## 2024-01-27 PROCEDURE — 86900 BLOOD TYPING SEROLOGIC ABO: CPT

## 2024-01-27 RX ORDER — ONDANSETRON 2 MG/ML
4 INJECTION INTRAMUSCULAR; INTRAVENOUS EVERY 6 HOURS PRN
Status: DISCONTINUED | OUTPATIENT
Start: 2024-01-27 | End: 2024-01-28

## 2024-01-27 RX ORDER — OSELTAMIVIR PHOSPHATE 75 MG/1
75 CAPSULE ORAL EVERY 12 HOURS SCHEDULED
Status: DISCONTINUED | OUTPATIENT
Start: 2024-01-27 | End: 2024-01-30 | Stop reason: HOSPADM

## 2024-01-27 RX ORDER — OXYTOCIN/RINGER'S LACTATE 30/500 ML
1-30 PLASTIC BAG, INJECTION (ML) INTRAVENOUS
Status: DISCONTINUED | OUTPATIENT
Start: 2024-01-27 | End: 2024-01-28

## 2024-01-27 RX ORDER — BUPIVACAINE HYDROCHLORIDE 2.5 MG/ML
30 INJECTION, SOLUTION EPIDURAL; INFILTRATION; INTRACAUDAL ONCE AS NEEDED
Status: DISCONTINUED | OUTPATIENT
Start: 2024-01-27 | End: 2024-01-28

## 2024-01-27 RX ORDER — ACETAMINOPHEN 325 MG/1
975 TABLET ORAL EVERY 8 HOURS PRN
Status: DISCONTINUED | OUTPATIENT
Start: 2024-01-27 | End: 2024-01-30 | Stop reason: HOSPADM

## 2024-01-27 RX ORDER — ACETAMINOPHEN 325 MG/1
650 TABLET ORAL EVERY 6 HOURS PRN
COMMUNITY

## 2024-01-27 RX ORDER — PANTOPRAZOLE SODIUM 40 MG/1
40 TABLET, DELAYED RELEASE ORAL DAILY
Status: DISCONTINUED | OUTPATIENT
Start: 2024-01-28 | End: 2024-01-28

## 2024-01-27 RX ORDER — ACETAMINOPHEN 325 MG/1
975 TABLET ORAL EVERY 8 HOURS PRN
Status: DISCONTINUED | OUTPATIENT
Start: 2024-01-27 | End: 2024-01-27

## 2024-01-27 RX ORDER — SODIUM CHLORIDE, SODIUM LACTATE, POTASSIUM CHLORIDE, CALCIUM CHLORIDE 600; 310; 30; 20 MG/100ML; MG/100ML; MG/100ML; MG/100ML
125 INJECTION, SOLUTION INTRAVENOUS CONTINUOUS
Status: DISCONTINUED | OUTPATIENT
Start: 2024-01-27 | End: 2024-01-28

## 2024-01-27 RX ADMIN — ACETAMINOPHEN 975 MG: 325 TABLET, FILM COATED ORAL at 21:46

## 2024-01-27 RX ADMIN — OSELTAMIVIR PHOSPHATE 75 MG: 75 CAPSULE ORAL at 23:29

## 2024-01-27 RX ADMIN — OXYTOCIN 2 MILLI-UNITS/MIN: 10 INJECTION INTRAVENOUS at 22:58

## 2024-01-27 RX ADMIN — SODIUM CHLORIDE, SODIUM LACTATE, POTASSIUM CHLORIDE, AND CALCIUM CHLORIDE 999 ML/HR: .6; .31; .03; .02 INJECTION, SOLUTION INTRAVENOUS at 20:32

## 2024-01-28 LAB
ATRIAL RATE: 97 BPM
BASE EXCESS BLDCOA CALC-SCNC: -7.9 MMOL/L (ref 3–11)
BASE EXCESS BLDCOV CALC-SCNC: -3.1 MMOL/L (ref 1–9)
HCO3 BLDCOA-SCNC: 19.5 MMOL/L (ref 17.3–27.3)
HCO3 BLDCOV-SCNC: 22 MMOL/L (ref 12.2–28.6)
O2 CT VFR BLDCOA CALC: 11.6 ML/DL
OXYHGB MFR BLDCOA: 48.4 %
OXYHGB MFR BLDCOV: 45.1 %
P AXIS: 76 DEGREES
PCO2 BLDCOA: 46.5 MM[HG] (ref 30–60)
PCO2 BLDCOV: 39.9 MM HG (ref 27–43)
PH BLDCOA: 7.24 [PH] (ref 7.23–7.43)
PH BLDCOV: 7.36 [PH] (ref 7.19–7.49)
PO2 BLDCOA: 24.2 MM HG (ref 5–25)
PO2 BLDCOV: 21 MM HG (ref 15–45)
PR INTERVAL: 122 MS
QRS AXIS: 54 DEGREES
QRSD INTERVAL: 72 MS
QT INTERVAL: 342 MS
QTC INTERVAL: 434 MS
SAO2 % BLDCOV: 10.5 ML/DL
T WAVE AXIS: 48 DEGREES
TREPONEMA PALLIDUM IGG+IGM AB [PRESENCE] IN SERUM OR PLASMA BY IMMUNOASSAY: NORMAL
VENTRICULAR RATE: 97 BPM

## 2024-01-28 PROCEDURE — 0HQ9XZZ REPAIR PERINEUM SKIN, EXTERNAL APPROACH: ICD-10-PCS | Performed by: OBSTETRICS & GYNECOLOGY

## 2024-01-28 PROCEDURE — 93010 ELECTROCARDIOGRAM REPORT: CPT | Performed by: INTERNAL MEDICINE

## 2024-01-28 PROCEDURE — 82805 BLOOD GASES W/O2 SATURATION: CPT

## 2024-01-28 RX ORDER — METHYLERGONOVINE MALEATE 0.2 MG/ML
INJECTION INTRAVENOUS
Status: COMPLETED
Start: 2024-01-28 | End: 2024-01-28

## 2024-01-28 RX ORDER — SENNOSIDES 8.6 MG
1 TABLET ORAL DAILY
Status: DISCONTINUED | OUTPATIENT
Start: 2024-01-28 | End: 2024-01-30 | Stop reason: HOSPADM

## 2024-01-28 RX ORDER — OXYTOCIN/RINGER'S LACTATE 30/500 ML
250 PLASTIC BAG, INJECTION (ML) INTRAVENOUS ONCE
Status: DISCONTINUED | OUTPATIENT
Start: 2024-01-28 | End: 2024-01-30 | Stop reason: HOSPADM

## 2024-01-28 RX ORDER — GUAIFENESIN 600 MG/1
600 TABLET, EXTENDED RELEASE ORAL EVERY 12 HOURS SCHEDULED
Status: DISCONTINUED | OUTPATIENT
Start: 2024-01-28 | End: 2024-01-30 | Stop reason: HOSPADM

## 2024-01-28 RX ORDER — TRANEXAMIC ACID 10 MG/ML
1000 INJECTION, SOLUTION INTRAVENOUS ONCE
Status: COMPLETED | OUTPATIENT
Start: 2024-01-28 | End: 2024-01-28

## 2024-01-28 RX ORDER — LOPERAMIDE HYDROCHLORIDE 2 MG/1
2 CAPSULE ORAL 4 TIMES DAILY PRN
Status: DISCONTINUED | OUTPATIENT
Start: 2024-01-28 | End: 2024-01-30 | Stop reason: HOSPADM

## 2024-01-28 RX ORDER — ONDANSETRON 2 MG/ML
4 INJECTION INTRAMUSCULAR; INTRAVENOUS EVERY 8 HOURS PRN
Status: DISCONTINUED | OUTPATIENT
Start: 2024-01-28 | End: 2024-01-30 | Stop reason: HOSPADM

## 2024-01-28 RX ORDER — IBUPROFEN 600 MG/1
600 TABLET ORAL EVERY 6 HOURS
Status: DISCONTINUED | OUTPATIENT
Start: 2024-01-28 | End: 2024-01-30 | Stop reason: HOSPADM

## 2024-01-28 RX ORDER — CARBOPROST TROMETHAMINE 250 UG/ML
INJECTION, SOLUTION INTRAMUSCULAR
Status: COMPLETED
Start: 2024-01-28 | End: 2024-01-28

## 2024-01-28 RX ORDER — GUAIFENESIN 600 MG/1
600 TABLET, EXTENDED RELEASE ORAL EVERY 12 HOURS SCHEDULED
Status: DISCONTINUED | OUTPATIENT
Start: 2024-01-28 | End: 2024-01-28

## 2024-01-28 RX ORDER — CALCIUM CARBONATE 500 MG/1
1000 TABLET, CHEWABLE ORAL DAILY PRN
Status: DISCONTINUED | OUTPATIENT
Start: 2024-01-28 | End: 2024-01-30 | Stop reason: HOSPADM

## 2024-01-28 RX ORDER — LIDOCAINE HYDROCHLORIDE 10 MG/ML
INJECTION, SOLUTION EPIDURAL; INFILTRATION; INTRACAUDAL; PERINEURAL
Status: COMPLETED
Start: 2024-01-28 | End: 2024-01-28

## 2024-01-28 RX ORDER — SIMETHICONE 80 MG
80 TABLET,CHEWABLE ORAL 4 TIMES DAILY PRN
Status: DISCONTINUED | OUTPATIENT
Start: 2024-01-28 | End: 2024-01-30 | Stop reason: HOSPADM

## 2024-01-28 RX ORDER — BENZOCAINE/MENTHOL 6 MG-10 MG
1 LOZENGE MUCOUS MEMBRANE DAILY PRN
Status: DISCONTINUED | OUTPATIENT
Start: 2024-01-28 | End: 2024-01-30 | Stop reason: HOSPADM

## 2024-01-28 RX ADMIN — SODIUM CHLORIDE, SODIUM LACTATE, POTASSIUM CHLORIDE, AND CALCIUM CHLORIDE 1000 ML: .6; .31; .03; .02 INJECTION, SOLUTION INTRAVENOUS at 16:53

## 2024-01-28 RX ADMIN — ONDANSETRON 4 MG: 2 INJECTION INTRAMUSCULAR; INTRAVENOUS at 02:17

## 2024-01-28 RX ADMIN — LOPERAMIDE HYDROCHLORIDE 2 MG: 2 CAPSULE ORAL at 06:37

## 2024-01-28 RX ADMIN — ACETAMINOPHEN 975 MG: 325 TABLET, FILM COATED ORAL at 17:10

## 2024-01-28 RX ADMIN — IBUPROFEN 600 MG: 600 TABLET ORAL at 06:37

## 2024-01-28 RX ADMIN — OSELTAMIVIR PHOSPHATE 75 MG: 75 CAPSULE ORAL at 22:19

## 2024-01-28 RX ADMIN — GUAIFENESIN 600 MG: 600 TABLET ORAL at 20:51

## 2024-01-28 RX ADMIN — TRANEXAMIC ACID 1000 MG: 10 INJECTION, SOLUTION INTRAVENOUS at 05:48

## 2024-01-28 RX ADMIN — METHYLERGONOVINE MALEATE 0.2 MG: 0.2 INJECTION INTRAVENOUS at 05:37

## 2024-01-28 RX ADMIN — CARBOPROST TROMETHAMINE 250 MCG: 250 INJECTION INTRAMUSCULAR at 05:44

## 2024-01-28 RX ADMIN — OSELTAMIVIR PHOSPHATE 75 MG: 75 CAPSULE ORAL at 08:08

## 2024-01-28 RX ADMIN — LIDOCAINE HYDROCHLORIDE 300 MG: 10 INJECTION, SOLUTION EPIDURAL; INFILTRATION; INTRACAUDAL; PERINEURAL at 05:35

## 2024-01-28 NOTE — OB LABOR/OXYTOCIN SAFETY PROGRESS
Oxytocin Safety Progress Check Note - Bartolo Quintana Colon 23 y.o. female MRN: [id number]    Unit/Bed#: -01 Encounter: [id number]    Dose (caron-units/min) Oxytocin: 4 caron-units/min  Contraction Frequency (minutes): 2-3  Contraction Intensity: Moderate     Cervical Dilation: 9        Cervical Effacement: 90  Fetal Station: 0  Baseline Rate (FHR): 140 bpm  Fetal Heart Rate (FHT): 145 BPM  FHR Category: 1               Vital Signs:   Vitals:    01/28/24 0128   BP: 121/63   Pulse: 96   Resp: 20   Temp: 99 °F (37.2 °C)   SpO2: 99%       Notes/comments:   FHT category 1 with contractions every 2-4 min.  Pit running at 4, continue titrating to contractions.  SVE 9/90/0, and patient feeling pressure.  Dr. Leblanc aware.      Nneka Carmona MD 1/28/2024 5:04 AM

## 2024-01-28 NOTE — DISCHARGE INSTRUCTIONS
Vaginal Delivery   WHAT YOU SHOULD KNOW:   A vaginal delivery is the birth of your baby through your vagina (birth canal).        AFTER YOU LEAVE:   Medicines:  NSAIDs  help decrease swelling and pain or fever. This medicine is available with or without a doctor's order. NSAIDs can cause stomach bleeding or kidney problems in certain people. If you take blood thinner medicine, always ask your healthcare provider if NSAIDs are safe for you. Always read the medicine label and follow directions.    Take your medicine as directed.  Call your healthcare provider if you think your medicine is not helping or if you have side effects. Tell him if you are allergic to any medicine. Keep a list of the medicines, vitamins, and herbs you take. Include the amounts, and when and why you take them. Bring the list or the pill bottles to follow-up visits. Carry your medicine list with you in case of an emergency.  Follow up with your primary healthcare provider:  Most women need to return 6 weeks after a vaginal delivery. Ask about how to care for your wounds or stitches. Write down your questions so you remember to ask them during your visits.  Activity:  Rest as much as possible. Try to keep all activities short. You may be able to do some exercise soon after you have your baby. Talk with your primary healthcare provider before you start exercising. If you work outside the home, ask when you can return to your job.  Kegel exercises:  Kegel exercises may help your vaginal and rectal muscles heal faster. You can do Kegel exercises by tightening and relaxing the muscles around your vagina. Kegel exercises help make the muscles stronger.   Breast care:  When your milk comes in, your breasts may feel full and hard. Ask how to care for your breasts, even if you are not breastfeeding.   Constipation:  Do not try to push the bowel movement out if it is too hard. High-fiber foods, extra liquids, and regular exercise can help you prevent  constipation. Examples of high-fiber foods are fruit and bran. Prune juice and water are good liquids to drink. Regular exercise helps your digestive system work. You may also be told to take over-the-counter fiber and stool softener medicines. Take these items as directed.   Hemorrhoids:  Pregnancy can cause severe hemorrhoids. You may have rectal pain because of the hemorrhoids. Ask how to prevent or treat hemorrhoids.  Perineum care:  Your perineum is the area between your vagina and anus. Keep the area clean and dry to help it heal and to prevent infection. Wash the area gently with soap and water when you bathe or shower. Rinse your perineum with warm water when you use the toilet. Your primary healthcare provider may suggest you use a warm sitz bath to help decrease pain. A sitz bath is a bathtub or basin filled to hip level. Stay in the sitz bath for 20 to 30 minutes, or as directed.   Vaginal discharge:  You will have vaginal discharge, called lochia, after your delivery. The lochia is bright red the first day or two after the birth. By the fourth day, the amount decreases, and it turns red-brown. Use a sanitary pad rather than a tampon to prevent a vaginal infection. It is normal to have lochia up to 8 weeks after your baby is born.   Monthly periods:  Your period may start again within 7 to 12 weeks after your baby is born. If you are breastfeeding, it may take longer for your period to start again. You can still get pregnant again even though you do not have your monthly period. Talk with your primary healthcare provider about a birth control method that will be good for you if you do not want to get pregnant.  Mood changes:  Many new mothers have some kind of mood changes after delivery. Some of these changes occur because of lack of sleep, hormone changes, and caring for a new baby. Some mood changes can be more serious, such as postpartum depression. Talk with your primary healthcare provider if you  feel unable to care for yourself or your baby.  Sexual activity:  You may need to avoid sex for 6 to 7 weeks after you have your baby. You may notice you have a decreased desire for sex, or sex may be painful. You may need to use a vaginal lubricant (gel) to help make sex more comfortable.  Contact your primary healthcare provider if:   You have heavy vaginal bleeding that fills 1 or more sanitary pads in 1 hour.    You have a fever.    Your pain does not go away, or gets worse.    The skin between your vagina and rectum is swollen, warm, or red.    You have swollen, hard, or painful breasts.    You feel very sad or depressed.    You feel more tired than usual.     You have questions or concerns about your condition or care.  Seek care immediately or call 911 if:   You have pus or yellow drainage coming from your vagina or wound.    You are urinating very little, or not at all.    Your arm or leg feels warm, tender, and painful. It may look swollen and red.    You feel lightheaded, have sudden and worsening chest pain, or trouble breathing. You may have more pain when you take deep breaths or cough, or you may cough up blood.  © 2014 [employer]. Information is for End User's use only and may not be sold, redistributed or otherwise used for commercial purposes. All illustrations and images included in CareNotes® are the copyrighted property of [employer]. or [employer].  The above information is an  only. It is not intended as medical advice for individual conditions or treatments. Talk to your doctor, nurse or pharmacist before following any medical regimen to see if it is safe and effective for you.    Postpartum Perineal Care   WHAT YOU NEED TO KNOW:   Postpartum perineal care is care for your perineum after you have a baby. The perineum is your vagina and anus.   DISCHARGE INSTRUCTIONS:   Care for your perineum:  Healthcare providers will give you a small squirt  bottle and show you how to use it. Do the following after you use the toilet and before you put on a new pad:  Remove the soiled pad    Use the squirt bottle to rinse your perineum from front to back while you sit on the toilet     Pat the area dry from front to back with toilet paper or a cotton cloth     Put on a fresh pad    Wash your hands  Decrease pain:  Ask your healthcare provider about these and other ways to decrease perineal pain:  Sitz baths:  Healthcare providers may give you a portable sitz bath. This is a small tub that fits in the toilet. Fill the sitz bath or bathtub with 4 to 6 inches of warm water. Sit in the warm water for 20 minutes 2 to 3 times a day.    Ice:  Ice helps decrease swelling and pain. Ice may also help prevent tissue damage. Use an ice pack, or put crushed ice in a plastic bag. Cover it with a towel and place it on your perineum for 15 to 20 minutes every hour, or as directed.    Medicine spray, wipes, or pads:  Healthcare providers may give you a medicine spray or wipes soaked with numbing medicine to decrease the pain. Pads that contain an herb called witch hazel may also help reduce pain. Use these after perineal care or a sitz bath.  Follow up with your healthcare provider as directed:  Write down your questions so you remember to ask them during your visits.   Contact your healthcare provider if:   You have heavy vaginal bleeding that fills 1 or more sanitary pads in 1 hour.    You have foul-smelling vaginal discharge.    You feel weak or lightheaded.    You have questions or concerns about your condition or care.  Seek care immediately or call 911 if:   You have large blood clots or bright red blood coming from your vagina.    You have abdominal pain, vomiting, and a fever.  © 2017 [employer] Information is for End User's use only and may not be sold, redistributed or otherwise used for commercial purposes. All illustrations and images included in CareNotes®  are the copyrighted property of [employer]. or [employer].  The above information is an  only. It is not intended as medical advice for individual conditions or treatments. Talk to your doctor, nurse or pharmacist before following any medical regimen to see if it is safe and effective for you.      Postpartum Depression   WHAT YOU NEED TO KNOW:   What is postpartum depression?  Postpartum depression is a mood disorder that occurs after giving birth. A mood is an emotion or a feeling. Moods affect your behavior and how you feel about yourself and life in general. Depression is a sad mood that you cannot control. Women often feel sad, afraid, or nervous after their baby is born. These feelings are called postpartum blues or baby blues, and they usually go away in 1 to 2 weeks. With postpartum depression, these symptoms get worse and continue for more than 2 weeks. Postpartum depression is a serious condition that affects your daily activities and relationships.   What causes postpartum depression?  Healthcare providers do not know exactly what causes postpartum depression. It may be caused by a sudden drop in hormone levels after childbirth. A previous episode of postpartum depression or a family history of depression may increase your risk. Several things may trigger postpartum depression:  Lack of support from the baby's father or other family members    Feeling more tired than usual    Stress, a poor diet, or lack of sleep    Pain after childbirth or pain during breastfeeding    Sudden change in lifestyle  How is postpartum depression diagnosed?  Postpartum depression affects your daily activities and your relationships with other people. Healthcare providers will ask you questions about your signs and symptoms and how they are affecting your life. The symptoms of postpartum depression usually begin within 1 month after childbirth. You feel depressed or lose interest in activities you  enjoy nearly every day for at least 2 weeks. You also have 4 or more of the following symptoms:  You feel tired or have less energy than usual.     You feel unimportant or guilty most of the time.    You think about hurting or killing yourself.    Your appetite changes. You may lose your appetite and lose weight without trying. Your appetite may also increase and you may gain weight.    You are restless, irritable, or withdrawn.    You have trouble concentrating and remembering things. You have trouble doing daily tasks or making decisions.    You have trouble sleeping, even after the baby is asleep.  How is postpartum depression treated?   Psychotherapy:  During therapy, you will talk with healthcare providers about how to cope with your feelings and moods. This can be done alone or in a group. It may also be done with family members or your partner.     Antidepressants:  This medicine is given to decrease or stop the symptoms of depression. You usually need to take antidepressants for several weeks before you begin to feel better. Do not stop taking antidepressants unless your healthcare provider tells you to. Healthcare providers may try a different antidepressant if one type does not work.  What can I do to feel better?   Rest:  Do not try to do everything all at the same time. Do only what is needed and let other things wait until later. Ask your family or friends for help, especially if you have other children. Ask your partner to help with night feedings or other baby care. Try to sleep when the baby naps.     Get emotional support:  Share your feelings with your partner, a friend, or another mother.     Take care of yourself:  Shower and dress each day. Do not skip meals. Try to get out of the house a little each day. Get regular exercise. Eat a healthy diet. Avoid alcohol because it can make your depression worse. Do not isolate yourself. Go for a walk or meet with a friend. It is also important that you  have some time by yourself each day.  How do I find support and more information?   [employer] [place] [facility] ([facility]), Public Information & Communication Branch  [address], [address], [address]  [place] [address]   Phone: [phone]  Phone: [phone]  Web Address: [email or web address]  When should I contact my healthcare provider?   You cannot make it to your next visit.    Your depression does not get better with treatment or it gets worse.     You have questions or concerns about your condition or care.  When should I seek immediate care or call 911?   You think about hurting or killing yourself, your baby, or someone else.    You feel like other people want to hurt you.     You hear voices telling you to hurt yourself or your baby.  CARE AGREEMENT:   You have the right to help plan your care. Learn about your health condition and how it may be treated. Discuss treatment options with your caregivers to decide what care you want to receive. You always have the right to refuse treatment. The above information is an  only. It is not intended as medical advice for individual conditions or treatments. Talk to your doctor, nurse or pharmacist before following any medical regimen to see if it is safe and effective for you.  © 2017 [employer] Information is for End User's use only and may not be sold, redistributed or otherwise used for commercial purposes. All illustrations and images included in CareNotes® are the copyrighted property of [employer]M., Inc. or [employer].      Postpartum Bleeding   WHAT YOU NEED TO KNOW:   Postpartum bleeding is vaginal bleeding after childbirth. This bleeding is normal, whether your baby was born vaginally or by . It contains blood and the tissue that lined the inside of your uterus when you were pregnant.   DISCHARGE INSTRUCTIONS:   What to expect with postpartum bleeding:  Postpartum  bleeding usually lasts at least 10 days, and may last longer than 6 weeks. Your bleeding may range from light (barely staining a pad) to heavy (soaking a pad in 1 hour). Usually, you have heavier bleeding right after childbirth, which slows over the next few weeks until it stops. The bleeding is red or dark brown with clots for the first 1 to 3 days. It then turns pink for several days, and then becomes a white or yellow discharge until it ends.  Follow up with your obstetrician as directed:  Do not have sex until your obstetrician says it is okay. Write down your questions so you remember to ask them during your visits.  Contact your healthcare provider or obstetrician if:   Your bleeding increases, or you have heavy bleeding that soaks a pad in 1 hour for 2 hours in a row.    You pass large blood clots.    You are breathing faster than normal, or your heart is beating faster than normal.    You are urinating less than usual, or not at all.    You feel dizzy.    You have questions or concerns about your condition or care.  Seek immediate care or call 911 if:   You are suddenly short of breath and feel lightheaded.    You have sudden chest pain.  © 2017 [employer] Information is for End User's use only and may not be sold, redistributed or otherwise used for commercial purposes. All illustrations and images included in CareNotes® are the copyrighted property of [employer] or [employer].  The above information is an  only. It is not intended as medical advice for individual conditions or treatments. Talk to your doctor, nurse or pharmacist before following any medical regimen to see if it is safe and effective for you.      Breast Care for the Breast Feeding Mother   WHAT YOU SHOULD KNOW:   Your breasts will go through normal changes while you are breastfeeding. Sometimes breast and nipple problems can develop while you are breastfeeding. Learn about changes that are  normal and those that may be a problem. Breast care can help you prevent and manage problems so you and your baby can enjoy the benefits of breastfeeding.  AFTER YOU LEAVE:   Breast changes while you are breastfeeding:   For the first few days after your baby is born, your body makes a small amount of breast milk (colostrum). Within about 2 to 5 days, your body will begin making mature milk. It may take up to 10 days or longer for mature milk to come in. When your mature milk comes in, your breasts will become full and firm. They may feel tender.     Breastfeeding your baby will decrease the full feeling in your breasts. You may feel a tingly sensation during feedings as milk is released from your breasts. This is called the milk let-down reflex. After 7 or more days, the fullness may feel like it has decreased. Your nipples should look the same as they did before you started breastfeeding. Breasts that feel full before and empty after breastfeeding are signs that breastfeeding is going well.  Breast problems that can occur while you are breastfeeding:   Nipple soreness  may occur when you begin to breastfeed your baby. You may also have nipple soreness if your baby is not latched on to your breast correctly. Correct positioning and latch-on may decrease or stop the pain in your nipples. Work with your caregivers to help your baby latch on correctly. It may also be helpful to place warm, wet compresses on your nipples to help decrease pain.     Plugged milk ducts  may cause painful breast lumps. Plugged ducts may be caused by not emptying your breasts completely during feedings. When your baby pauses during breastfeeding, massage and gently squeeze your breast. Gentle massage may unplug a blocked milk duct. Pump out any milk left in your breasts after your baby is done breastfeeding. Avoid wearing tight tops, tight bras, or under-wire bras, because they may put pressure on your breasts.    Engorgement  may occur as  your milk comes in soon after you begin breastfeeding. Engorgement may cause your breasts to become swollen and painful. Your breasts may also become engorged if you miss a feeding or you do not breastfeed on demand. The best way to decrease engorgement symptoms is to empty your breasts by feeding your baby often. Engorgement can make it hard for your baby to latch on to your breast. If this happens, express a small amount of milk and then have your baby latch on. Cold compresses, gel packs, or ice packs on your breasts can help decrease pain and swelling. Ask your caregiver how often and how long you should use cold, or ice packs.     A breast infection called mastitis  can develop if you have plugged milk ducts or engorgement. Mastitis causes your breasts to become red, swollen, and painful. You may also have flu-like symptoms, such as chills and a fever. Place heat on your breasts to help decrease the pain. You may want to place a moist, warm cloth on the painful breast or both of your breasts. Ask how often to do this. Your primary healthcare provider (PHP) may suggest that you take an NSAID, such as ibuprofen, to decrease pain and swelling. He may also order antibiotics to treat mastitis. Ask about feeding your baby when you have a breast infection.  How to help prevent or manage breast problems while you are breastfeeding:   Learn how to position your baby and latch him on correctly.  To latch your baby correctly to your breast, make sure that his mouth covers most of your areola (dark area around your nipple). He should not be attached only to the nipple. Your baby is latched on well if you feel comfortable and do not feel pain. A correct latch helps him get enough milk and can help to prevent sore nipples and other breast problems. There are several breastfeeding positions that you can try. Find the position that works best for you and your baby. Ask your caregiver for more information about how to hold and  breastfeed your baby.     Prevent biting.  Your baby may get teeth at about 3 to 4 months of age. To help prevent biting, break his suction once he is finished or if he has fallen asleep. To break his suction, slip a finger into the side of his mouth. If your baby bites you, respond with surprise or unhappiness. Offer praise when he does not bite you.     Breastfeed your baby regularly.  Feed your baby 8 to 12 times a day. You may need to wake up your baby at night to feed him. It is okay to feed from 1 or both breasts at each feeding. Your baby should breastfeed from both breasts equally over the course of a day. If your baby only feeds from 1 side during a feeding, offer your other breast to him first for the next feeding.     Schedule and keep follow-up visits.  Talk to your baby's pediatrician or your PHP during follow-up visits if you have breast problems. Caregivers may suggest that you, or you and your partner, attend classes on breastfeeding. You also may want to join a breastfeeding support group. Caregivers may suggest that you see a lactation consultant. This is a caregiver who can help you with breastfeeding.  Contact your PHP if:   You have a fever and chills.    You have body aches and you feel like you do not have any energy.    One or both of your breasts is red, swollen or hard, painful, and feels warm or hot.    You have breast engorgement that does not get better within 24 hours.     You see or feel a lump in your breast that hurts when you touch it.    You have nipple pain during breastfeeding or between feedings.     Your nipples are red, dry, cracked, or bleeding, or they have scabs on them.     You have questions or concerns about your condition or care.  © 2014 [employer] Information is for End User's use only and may not be sold, redistributed or otherwise used for commercial purposes. All illustrations and images included in CareNotes® are the copyrighted property of  [employer]. or [employer].  The above information is an  only. It is not intended as medical advice for individual conditions or treatments. Talk to your doctor, nurse or pharmacist before following any medical regimen to see if it is safe and effective for you.

## 2024-01-28 NOTE — L&D DELIVERY NOTE
Vaginal Delivery Summary - OB/GYN   Bartolo Antonio 23 y.o. female MRN: [id number]  Unit/Bed#:  205-01 Encounter: [id number]      Pre-delivery Diagnosis:   Pregnancy at 40w4d  Influenza A positive  Hep B non-immune  Rubella non-immune    Post-delivery Diagnosis:  Same, delivered  Lower uterine segment atony without hemorrhage    Procedure:  Spontaneous Vaginal Delivery  First degree perineal laceration repair    Attending: Dr. Leblanc    Assistant(s): Dr. Nneka Carmona    Anesthesia: none    QBL: 835 mL    Complications: none apparent    Specimens:   1. Arterial and venous cord gases  2. Cord blood  3. Segment of umbilical cord  4. Placenta to storage    Findings:  1. Viable female on 2024 at 0523, with APGARS of 8 and 9 at 1 and 5 minutes respectively  2. Spontaneous delivery of intact placenta at 0528  3. 1 degree perineal laceration repaired with 3-0 Vicryl rapide  4. Lower uterine segment atony without hemorrhage treated with Methergine, Hemabate, & TXA  5. Blood gases:   Arterial pH: 7.241   Arterial base excess: -7.9   Venous pH: 7.359   Venous base excess: -3.1    Disposition:  Patient tolerated the procedure well and was recovering in labor and delivery room     Brief history and labor course:  Ms. Bartolo Antonio is a 23 y.o.  at 40w4d. She presented to labor and delivery for scheduled eIOL. Her pregnancy was complicated by the above-mentioned problems. On admission, cervicla exam was 1.5/80/-3.  She was induced with Clancy balloon, and when it came out, she was 4/80/-2.  Pitocin was started, and she was subsequently ruptured for clear blood-tinged fluid. She declined epidural and progressed to complete dilation and then began to push.    Description of procedure:  After pushing for 10 minutes, at 0523 patient delivered a viable female , wt pending, apgars of 8 (1 min) and 9 (5 min). The fetal vertex delivered spontaneously in the ZANDER position. There was no nuchal cord. The right  anterior shoulder delivered atraumatically with maternal expulsive forces and the assistance of gentle downward traction. The left posterior shoulder delivered with maternal expulsive forces and the assistance of gentle upward traction. The remainder of the fetus delivered spontaneously.     Upon delivery, the infant was placed on the mothers abdomen and the cord was clamped and cut after delayed cord clamping. The infant was noted to cry spontaneously and was moving all extremities appropriately. There was no evidence for injury. Awaiting nurse resuscitators evaluated the . Arterial and venous cord blood gases and cord blood was collected for analysis. These were promptly sent to the lab. In the immediate post-partum, 30 units of IV pitocin was administered, and the uterus was noted to contract down well with massage and pitocin. The placenta delivered spontaneously at 0528 and was noted to have an eccentrally inserted 3 vessel cord.     The vagina, cervix, perineum, and rectum were inspected and there was noted to be a first degree perineal laceration which was repaired with 3-0 Vicryl rapide in running locked fashion with subcuticular closure of the skin..    During laceration repair, intermittent bleeding was noted.  Large clots were expressed from the lower uterine segment, at which time slight lower uterine segment atony was appreciate & resolved immediately upon evacuation of clots.  Due to recurrence of this after initial expression, Methergine was given.  When bleeding and reaccumulation of clots occurred again, Hemabate and TXA were given, after which uterine tone and bleeding improved.    Bimanual exam revealed minimal clots and good uterine tone.  The fundus was firm and at the level of the umbilicus.  At the conclusion of the procedure, all needle, sponge, and instrument counts were noted to be correct. Patient tolerated the procedure well and was allowed to recover in labor and delivery room  with family and  before being transferred to the post-partum floor. Dr. Leblanc was present and participated in all key portions of the case.        Nneka Carmona MD  2024  6:00 AM

## 2024-01-28 NOTE — PLAN OF CARE
Problem: PAIN - ADULT  Goal: Verbalizes/displays adequate comfort level or baseline comfort level  Description: Interventions:  - Encourage patient to monitor pain and request assistance  - Assess pain using appropriate pain scale  - Administer analgesics based on type and severity of pain and evaluate response  - Implement non-pharmacological measures as appropriate and evaluate response  - Consider cultural and social influences on pain and pain management  - Notify physician/advanced practitioner if interventions unsuccessful or patient reports new pain  1/28/2024 0604 by Aundrea Schaeffer RN  Outcome: Progressing  1/27/2024 2211 by Aundrea Schaeffer RN  Outcome: Progressing     Problem: INFECTION - ADULT  Goal: Absence or prevention of progression during hospitalization  Description: INTERVENTIONS:  - Assess and monitor for signs and symptoms of infection  - Monitor lab/diagnostic results  - Monitor all insertion sites, i.e. indwelling lines, tubes, and drains  - Monitor endotracheal if appropriate and nasal secretions for changes in amount and color  - [place] appropriate cooling/warming therapies per order  - Administer medications as ordered  - Instruct and encourage patient and family to use good hand hygiene technique  - Identify and instruct in appropriate isolation precautions for identified infection/condition  1/28/2024 0604 by Aundrea Schaeffer RN  Outcome: Progressing  1/27/2024 2211 by Aundrea Schaeffer RN  Outcome: Progressing     Problem: SAFETY ADULT  Goal: Patient will remain free of falls  Description: INTERVENTIONS:  - Educate patient/family on patient safety including physical limitations  - Instruct patient to call for assistance with activity   - Consult OT/PT to assist with strengthening/mobility   - Keep Call bell within reach  - Keep bed low and locked with side rails adjusted as appropriate  - Keep care items and personal belongings within reach  - Initiate and maintain  comfort rounds  - Make Fall Risk Sign visible to staff  - Apply yellow socks and bracelet for high fall risk patients  - Consider moving patient to room near nurses station  1/28/2024 0604 by Aundrea Schaeffer RN  Outcome: Progressing  1/27/2024 2211 by Aundrea Schaeffer RN  Outcome: Progressing  Goal: Maintain or return to baseline ADL function  Description: INTERVENTIONS:  -  Assess patient's ability to carry out ADLs; assess patient's baseline for ADL function and identify physical deficits which impact ability to perform ADLs (bathing, care of mouth/teeth, toileting, grooming, dressing, etc.)  - Assess/evaluate cause of self-care deficits   - Assess range of motion  - Assess patient's mobility; develop plan if impaired  - Assess patient's need for assistive devices and provide as appropriate  - Encourage maximum independence but intervene and supervise when necessary  - Involve family in performance of ADLs  - Assess for home care needs following discharge   - Consider OT consult to assist with ADL evaluation and planning for discharge  - Provide patient education as appropriate  1/28/2024 0604 by Aundrea Schaeffer RN  Outcome: Progressing  1/27/2024 2211 by Aundrea Schaeffer RN  Outcome: Progressing  Goal: Maintains/Returns to pre admission functional level  Description: INTERVENTIONS:  - Perform AM-PAC 6 Click Basic Mobility/ Daily Activity assessment daily.  - Set and communicate daily mobility goal to care team and patient/family/caregiver.   - Collaborate with rehabilitation services on mobility goals if consulted  - Out of bed for toileting  - Record patient progress and toleration of activity level   1/28/2024 0604 by Aundrea Schaeffer RN  Outcome: Progressing  1/27/2024 2211 by Aundrea Schaeffer RN  Outcome: Progressing     Problem: Knowledge Deficit  Goal: Patient/family/caregiver demonstrates understanding of disease process, treatment plan, medications, and discharge  instructions  Description: Complete learning assessment and assess knowledge base.  Interventions:  - Provide teaching at level of understanding  - Provide teaching via preferred learning methods  2024 by Aundrea Schaeffer RN  Outcome: Progressing  2024 by Aundrea Schaeffer RN  Outcome: Progressing     Problem: DISCHARGE PLANNING  Goal: Discharge to home or other facility with appropriate resources  Description: INTERVENTIONS:  - Identify barriers to discharge w/patient and caregiver  - Arrange for needed discharge resources and transportation as appropriate  - Identify discharge learning needs (meds, wound care, etc.)  - Arrange for interpretive services to assist at discharge as needed  - Refer to Case Management Department for coordinating discharge planning if the patient needs post-hospital services based on physician/advanced practitioner order or complex needs related to functional status, cognitive ability, or social support system  2024 by Aundrea Schaeffer RN  Outcome: Progressing  2024 by Aundrea Schaeffer RN  Outcome: Progressing     Problem: POSTPARTUM  Goal: Experiences normal postpartum course  Description: INTERVENTIONS:  - Monitor maternal vital signs  - Assess uterine involution and lochia  Outcome: Progressing  Goal: Appropriate maternal -  bonding  Description: INTERVENTIONS:  - Identify family support  - Assess for appropriate maternal/infant bonding   -Encourage maternal/infant bonding opportunities  - Referral to  or  as needed  Outcome: Progressing  Goal: Establishment of infant feeding pattern  Description: INTERVENTIONS:  - Assess breast/bottle feeding  - Refer to lactation as needed  Outcome: Progressing  Goal: Incision(s), wounds(s) or drain site(s) healing without S/S of infection  Description: INTERVENTIONS  - Assess and document dressing, incision, wound bed, drain sites and surrounding tissue  -  Provide patient and family education  Outcome: Progressing

## 2024-01-28 NOTE — OB LABOR/OXYTOCIN SAFETY PROGRESS
Oxytocin Safety Progress Check Note - Bartolo Quintana Colon 23 y.o. female MRN: [id number]    Unit/Bed#: -01 Encounter: [id number]    Dose (caron-units/min) Oxytocin: 4 caron-units/min  Contraction Frequency (minutes): 1.5-4  Contraction Intensity: Mild     Cervical Dilation: 5        Cervical Effacement: 80  Fetal Station: -1  Baseline Rate (FHR): 140 bpm  Fetal Heart Rate (FHT): 140 BPM  FHR Category: I               Vital Signs:   Vitals:    01/28/24 0035   BP:    Pulse:    Resp:    Temp: 99.3 °F (37.4 °C)       Notes/comments:   Bartolo is doing an excellent job of breathing through contractions and has been laboring on the ball.  SVE performed, 5/80/-1.  Amniotomy performed for clear and blood-tinged fluid.  Patient at this time prefers to continue to labor unmedicated.  FHR category 1.  Continue Pitocin titration.    Yoselin Redmond MD 1/28/2024 1:25 AM

## 2024-01-28 NOTE — PLAN OF CARE
Problem: PAIN - ADULT  Goal: Verbalizes/displays adequate comfort level or baseline comfort level  Description: Interventions:  - Encourage patient to monitor pain and request assistance  - Assess pain using appropriate pain scale  - Administer analgesics based on type and severity of pain and evaluate response  - Implement non-pharmacological measures as appropriate and evaluate response  - Consider cultural and social influences on pain and pain management  - Notify physician/advanced practitioner if interventions unsuccessful or patient reports new pain  Outcome: Progressing     Problem: INFECTION - ADULT  Goal: Absence or prevention of progression during hospitalization  Description: INTERVENTIONS:  - Assess and monitor for signs and symptoms of infection  - Monitor lab/diagnostic results  - Monitor all insertion sites, i.e. indwelling lines, tubes, and drains  - Monitor endotracheal if appropriate and nasal secretions for changes in amount and color  - [place] appropriate cooling/warming therapies per order  - Administer medications as ordered  - Instruct and encourage patient and family to use good hand hygiene technique  - Identify and instruct in appropriate isolation precautions for identified infection/condition  Outcome: Progressing     Problem: SAFETY ADULT  Goal: Patient will remain free of falls  Description: INTERVENTIONS:  - Educate patient/family on patient safety including physical limitations  - Instruct patient to call for assistance with activity   - Consult OT/PT to assist with strengthening/mobility   - Keep Call bell within reach  - Keep bed low and locked with side rails adjusted as appropriate  - Keep care items and personal belongings within reach  - Initiate and maintain comfort rounds  - Make Fall Risk Sign visible to staff  - Offer Toileting every  Hours, in advance of need  - Initiate/Maintain alarm  - Obtain necessary fall risk management equipment:   - Apply yellow socks and  bracelet for high fall risk patients  - Consider moving patient to room near nurses station  Outcome: Progressing  Goal: Maintain or return to baseline ADL function  Description: INTERVENTIONS:  -  Assess patient's ability to carry out ADLs; assess patient's baseline for ADL function and identify physical deficits which impact ability to perform ADLs (bathing, care of mouth/teeth, toileting, grooming, dressing, etc.)  - Assess/evaluate cause of self-care deficits   - Assess range of motion  - Assess patient's mobility; develop plan if impaired  - Assess patient's need for assistive devices and provide as appropriate  - Encourage maximum independence but intervene and supervise when necessary  - Involve family in performance of ADLs  - Assess for home care needs following discharge   - Consider OT consult to assist with ADL evaluation and planning for discharge  - Provide patient education as appropriate  Outcome: Progressing  Goal: Maintains/Returns to pre admission functional level  Description: INTERVENTIONS:  - Perform AM-PAC 6 Click Basic Mobility/ Daily Activity assessment daily.  - Set and communicate daily mobility goal to care team and patient/family/caregiver.   - Collaborate with rehabilitation services on mobility goals if consulted  - Perform Range of Motion  times a day.  - Reposition patient every  hours.  - Dangle patient  times a day  - Stand patient times a day  - Ambulate patient  times a day  - Out of bed to chair  times a day   - Out of bed for meals  times a day  - Out of bed for toileting  - Record patient progress and toleration of activity level   Outcome: Progressing     Problem: Knowledge Deficit  Goal: Patient/family/caregiver demonstrates understanding of disease process, treatment plan, medications, and discharge instructions  Description: Complete learning assessment and assess knowledge base.  Interventions:  - Provide teaching at level of understanding  - Provide teaching via  preferred learning methods  Outcome: Progressing     Problem: DISCHARGE PLANNING  Goal: Discharge to home or other facility with appropriate resources  Description: INTERVENTIONS:  - Identify barriers to discharge w/patient and caregiver  - Arrange for needed discharge resources and transportation as appropriate  - Identify discharge learning needs (meds, wound care, etc.)  - Arrange for interpretive services to assist at discharge as needed  - Refer to Case Management Department for coordinating discharge planning if the patient needs post-hospital services based on physician/advanced practitioner order or complex needs related to functional status, cognitive ability, or social support system  Outcome: Progressing     Problem: POSTPARTUM  Goal: Experiences normal postpartum course  Description: INTERVENTIONS:  - Monitor maternal vital signs  - Assess uterine involution and lochia  Outcome: Progressing  Goal: Appropriate maternal -  bonding  Description: INTERVENTIONS:  - Identify family support  - Assess for appropriate maternal/infant bonding   -Encourage maternal/infant bonding opportunities  - Referral to  or  as needed  Outcome: Progressing  Goal: Establishment of infant feeding pattern  Description: INTERVENTIONS:  - Assess breast/bottle feeding  - Refer to lactation as needed  Outcome: Progressing  Goal: Incision(s), wounds(s) or drain site(s) healing without S/S of infection  Description: INTERVENTIONS  - Assess and document dressing, incision, wound bed, drain sites and surrounding tissue  - Provide patient and family education  - Perform skin care/dressing changes every  Outcome: Progressing

## 2024-01-28 NOTE — LACTATION NOTE
This note was copied from a baby's chart.  Follow up Lactation: mom called for latching. Demonstrated side lying - HE + with glistening on the nipple face.     Baby is spitty and gagging. Demonstrated use of bulb suction. Enc. S2s as baby is not interested in feeding.     Parents are feeling unwell due to the dx of the flu.     Enc. To call lactation for next latch.     Education on positioning and alignment. Mom is encouraged to:     - Bring baby up to the breast (use of pillows to elevate so baby's torso is against mom's breasts)   - Skin to skin for feedings with top hand exposed to show signs of satiation   - Chin deep into breast tissue (make baby look up to the nipple)   - nose aligned to the nipple   -Wait for wide gape, drag chin on the breast so nipple is aimed at the upper, back palate  - Cheek should be touching breast   - Deep, firm hold of baby with ear, shoulder, hip alignment    Information on hand expression given. Discussed benefits of knowing how to manually express breast including stimulating milk supply, softening nipple for latch and evacuating breast in the event of engorgement.    Mom is encouraged to place baby skin to skin for feedings. Skin to skin education provided for baby placement on mother's chest, baby only in diaper, blankets below shoulders on baby's back. Skin to skin is encouraged to continue at home for feedings and between feedings.

## 2024-01-28 NOTE — LACTATION NOTE
This note was copied from a baby's chart.  CONSULT - LACTATION  Baby Girl (Brennely) Lilian Antonio 0 days female MRN: [id number]    [facility] Room / Bed: (N)/(N) Encounter: [id number]    Maternal Information     MOTHER:  Bartolo Mckeon  Maternal Age: 23 y.o.   OB History: # 1 - Date: 24, Sex: Female, Weight: 3520 g (7 lb 12.2 oz), GA: 40w4d, Delivery: Vaginal, Spontaneous, Apgar1: 8, Apgar5: 9, Living: Living, Birth Comments: None   Previouse breast reduction surgery? No    Lactation history:   Has patient previously breast fed: No   How long had patient previously breast fed:     Previous breast feeding complications:     No past surgical history on file.     Birth information:  YOB: 2022   Time of birth: 5:23 AM   Sex: female   Delivery type: Vaginal, Spontaneous   Birth Weight: 3520 g (7 lb 12.2 oz)   Percent of Weight Change: 0%     Gestational Age: 40w4d   [unfilled]    Assessment     Breast and nipple assessment:  symmetrical, round breasts with adequate quadrants; dark areolas and small, everted round nipples    [place] Assessment:  spitty after birth    Feeding assessment: feeding well; latch difficulty due to spitty at breast and positioning  LATCH:  Latch: Grasps breast, tongue down, lips flanged, rhythmic sucking   Audible Swallowing: Spontaneous and intermittent (24 hours old)   Type of Nipple: Everted (After stimulation)   Comfort (Breast/Nipple): Soft/non-tender   Hold (Positioning): Partial assist, teach one side, mother does other, staff holds   LATCH Score: 9          Feeding recommendations:  breast feed on demand. Mom and staff attempted latch in delivery room. No latch achieved due to spitty at breast. Baby is demonstrating late-stage feeding cues in the room.     Mom has baby wrapped in blankets and holding in cradle hold. Mom wants assistance with latching baby. Demonstration and teach back of hand expression.  Demonstration of S2S and how to position baby to achieve a wide gape and deep latch. Brought baby up to the right breast in cross cradle hold with demonstration and teach-back. Ear, shoulder, hip alignment. Latch achieved with active, coordinated, over 10 suck sucking burst.    Enc.and demonstrated breast compressions. Pillows used to lift mom and baby.     Feeding log demonstrated with teach back to FOB. Ed. On RSB/DC book - mom requests info in Romanian. Will bring back to room. As feeding continues, mom appears to loosen her hold on baby and more intermittent sucks noted.     After approx. 10 min. Offered to demonstrate a position that mom does not need support to hold baby to the breast. Brought baby into laid back on the right breast. Ed. On nipple to nose to achieve a deep latch. Ed. & demonstration of positioning. With FOB providing c hold ot the breast, mom demonstrates more confidence in latching in laid back. Deep latch achieved with active, coordinated sucking with audible swallows.     Ed. On offering both breasts at every feeding.     Mom wants pump from ins. Would prefer a hands free - input order to cm    Enc. To call lactation    Education on positioning and alignment. Mom is encouraged to:     - Bring baby up to the breast (use of pillows to elevate so baby's torso is against mom's breasts)   - Skin to skin for feedings with top hand exposed to show signs of satiation   - Chin deep into breast tissue (make baby look up to the nipple)   - nose aligned to the nipple   -Wait for wide gape, drag chin on the breast so nipple is aimed at the upper, back palate  - Cheek should be touching breast   - Deep, firm hold of baby with ear, shoulder, hip alignment    Provided demonstration, education and support of deep latch to breast by placing the nipple to the nose, dragging down to chin to achieve a wide latch. Bring baby to the breast, not breast to baby. Move your shoulders down and away from your ears. Look  "for ear, shoulder, hip alignment. Baby's upper and lower lip should be flanged on the breast.    Demonstrated with teach back breast compressions during a feeding to increase milk transfer and stimulate suckling after a breathing/muscle break.     Mom is encouraged to place baby skin to skin for feedings. Skin to skin education provided for baby placement on mother's chest, baby only in diaper, blankets below shoulders on baby's back. Skin to skin is encouraged to continue at home for feedings and between feedings.    Discussed with MOB how to utilize feeding log & monitor baby's output. Education on signs of satiation during a feeding, size of baby's belly, and offering both breasts at every feeding session provided.    Nurse on demand: when baby gives hunger cues; when your breasts feel full, or at least every 3 hours during the day and every 5 hours at night counting from the beginning of one feeding to the beginning of the next; which ever comes first. When sucking and swallowing slow, gently compress the breast to restart flow. If active suck-swallow does not restart, gently remove the baby and offer the other breast; offering up to \"four\" breasts per feeding.    Information on hand expression given. Discussed benefits of knowing how to manually express breast including stimulating milk supply, softening nipple for latch and evacuating breast in the event of engorgement.    Mom is encouraged to place baby skin to skin for feedings. Skin to skin education provided for baby placement on mother's chest, baby only in diaper, blankets below shoulders on baby's back. Skin to skin is encouraged to continue at home for feedings and between feedings.    Worked on positioning infant up at chest level and starting to feed infant with nose arriving at the nipple. Then, using areolar compression to achieve a deep latch that is comfortable and exchanges optimum amounts of milk.     - Start feedings on breast that last " feeding ended   - allow no more than 3 hours between breast feeding sessions   - time between feedings is counted from the beginning of the first feed to the beginning of the next feeding session    Reviewed early signs of hunger, including tensing of hands and shoulders - no need to wait for open eyes.  Crying is a late hunger sign.  If baby is crying, soothe baby first and then attempt to latch.  Reviewed normal sucking patterns: transition from stimulation to nutritive to release or non-nutritive. The goal is to see and hear lots of swallowing.    Reviewed normal nursing pattern: infant could latch on one breast up to 30 minutes or until releases on own. Signs of satiation is open hand with fingers that do not grab your finger.  Discussed difference in sensation of non-nutritive v nutritive sucking    Met with mother. Provided mother with Ready, Set, Baby booklet.    Discussed Skin to Skin contact an benefits to mom and baby.  Talked about the delay of the first bath until baby has adjusted. Spoke about the benefits of rooming in. Feeding on cue and what that means for recognizing infant's hunger. Avoidance of pacifiers for the first month discussed. Talked about exclusive breastfeeding for the first 6 months.    Positioning and latch reviewed as well as showing images of other feeding positions.  Discussed the properties of a good latch in any position. Reviewed hand/manual expression.  Discussed s/s that baby is getting enough milk and some s/s that breastfeeding dyad may need further help.    Gave information on common concerns, what to expect the first few weeks after delivery, preparing for other caregivers, and how partners can help. Resources for support also provided.    Encouraged parents to call for assistance, questions, and concerns about breastfeeding.  Extension provided.    Provided education on growth spurts, when to introduce bottles; paced bottle feeding, and non-nutritive suck at the breast.  Provided education on Signs of satiation. Encouraged to call lactation to observe a latch prior to discharge for reassurance. Encouraged to call baby and me with any questions and closely monitor output.      Christy Hume, MA 1/28/2024 9:16 AM

## 2024-01-28 NOTE — OB LABOR/OXYTOCIN SAFETY PROGRESS
Oxytocin Safety Progress Check Note - Bartolo Quintana Colon 23 y.o. female MRN: [id number]    Unit/Bed#: -01 Encounter: [id number]    Dose (caron-units/min) Oxytocin: 4 caron-units/min  Contraction Frequency (minutes): 2-4  Contraction Intensity: Mild/Moderate     Cervical Dilation: 6        Cervical Effacement: 90  Fetal Station: -1  Baseline Rate (FHR): 140 bpm  Fetal Heart Rate (FHT): 138 BPM  FHR Category: II               Vital Signs:   Vitals:    01/28/24 0128   BP: 121/63   Pulse: 96   Resp: 20   Temp: 99 °F (37.2 °C)   SpO2: 99%       Notes/comments:   Patient is still breathing through contractions and laboring unmedicated at this time, noting increased rectal pressure.  SVE 6/90/-1, FHR with intermittent variable and late decelerations but mostly category 1. Continue pitocin.    Yoselin Redmond MD 1/28/2024 3:09 AM

## 2024-01-28 NOTE — PLAN OF CARE
Problem: BIRTH - VAGINAL/ SECTION  Goal: Fetal and maternal status remain reassuring during the birth process  Description: INTERVENTIONS:  - Monitor vital signs  - Monitor fetal heart rate  - Monitor uterine activity  - Monitor labor progression (vaginal delivery)  - DVT prophylaxis  - Antibiotic prophylaxis  Outcome: Progressing  Goal: Emotionally satisfying birthing experience for mother/fetus  Description: Interventions:  - Assess, plan, implement and evaluate the nursing care given to the patient in labor  - Advocate the philosophy that each childbirth experience is a unique experience and support the family's chosen level of involvement and control during the labor process   - Actively participate in both the patient's and family's teaching of the birth process  - Consider cultural, Oriental orthodox and age-specific factors and plan care for the patient in labor  Outcome: Progressing     Problem: PAIN - ADULT  Goal: Verbalizes/displays adequate comfort level or baseline comfort level  Description: Interventions:  - Encourage patient to monitor pain and request assistance  - Assess pain using appropriate pain scale  - Administer analgesics based on type and severity of pain and evaluate response  - Implement non-pharmacological measures as appropriate and evaluate response  - Consider cultural and social influences on pain and pain management  - Notify physician/advanced practitioner if interventions unsuccessful or patient reports new pain  Outcome: Progressing     Problem: INFECTION - ADULT  Goal: Absence or prevention of progression during hospitalization  Description: INTERVENTIONS:  - Assess and monitor for signs and symptoms of infection  - Monitor lab/diagnostic results  - Monitor all insertion sites, i.e. indwelling lines, tubes, and drains  - Monitor endotracheal if appropriate and nasal secretions for changes in amount and color  - [place] appropriate cooling/warming therapies per order  -  Administer medications as ordered  - Instruct and encourage patient and family to use good hand hygiene technique  - Identify and instruct in appropriate isolation precautions for identified infection/condition  Outcome: Progressing     Problem: SAFETY ADULT  Goal: Patient will remain free of falls  Description: INTERVENTIONS:  - Educate patient/family on patient safety including physical limitations  - Instruct patient to call for assistance with activity   - Consult OT/PT to assist with strengthening/mobility   - Keep Call bell within reach  - Keep bed low and locked with side rails adjusted as appropriate  - Keep care items and personal belongings within reach  - Initiate and maintain comfort rounds  - Make Fall Risk Sign visible to staff  - Apply yellow socks and bracelet for high fall risk patients  - Consider moving patient to room near nurses station  Outcome: Progressing  Goal: Maintain or return to baseline ADL function  Description: INTERVENTIONS:  -  Assess patient's ability to carry out ADLs; assess patient's baseline for ADL function and identify physical deficits which impact ability to perform ADLs (bathing, care of mouth/teeth, toileting, grooming, dressing, etc.)  - Assess/evaluate cause of self-care deficits   - Assess range of motion  - Assess patient's mobility; develop plan if impaired  - Assess patient's need for assistive devices and provide as appropriate  - Encourage maximum independence but intervene and supervise when necessary  - Involve family in performance of ADLs  - Assess for home care needs following discharge   - Consider OT consult to assist with ADL evaluation and planning for discharge  - Provide patient education as appropriate  Outcome: Progressing  Goal: Maintains/Returns to pre admission functional level  Description: INTERVENTIONS:  - Perform AM-PAC 6 Click Basic Mobility/ Daily Activity assessment daily.  - Set and communicate daily mobility goal to care team and  patient/family/caregiver.   - Out of bed for toileting  - Record patient progress and toleration of activity level   Outcome: Progressing     Problem: Knowledge Deficit  Goal: Patient/family/caregiver demonstrates understanding of disease process, treatment plan, medications, and discharge instructions  Description: Complete learning assessment and assess knowledge base.  Interventions:  - Provide teaching at level of understanding  - Provide teaching via preferred learning methods  Outcome: Progressing     Problem: DISCHARGE PLANNING  Goal: Discharge to home or other facility with appropriate resources  Description: INTERVENTIONS:  - Identify barriers to discharge w/patient and caregiver  - Arrange for needed discharge resources and transportation as appropriate  - Identify discharge learning needs (meds, wound care, etc.)  - Arrange for interpretive services to assist at discharge as needed  - Refer to Case Management Department for coordinating discharge planning if the patient needs post-hospital services based on physician/advanced practitioner order or complex needs related to functional status, cognitive ability, or social support system  Outcome: Progressing

## 2024-01-28 NOTE — PROGRESS NOTES
H & P- Obstetrics   Bartolo Antonio 23 y.o. female MRN: [id number]  Unit/Bed#: -01 Encounter: [id number]      Assessment/Plan:    Bartolo is a 23 y.o.  at 40w3d admitted for an induction of labor    SVE: Cervical Dilation: 1-2  Cervical Effacement: 80  Fetal Station: -3  OB Examiner: BERTO    Upper respiratory infection  Assessment & Plan  COVID/Flu/RSV collected on Admission  Tylenol PRN    Encounter for induction of labor  Assessment & Plan  Admit to OBGYN   Clear liquid diet   F/u T&S, CBC, RPR   IVF LR 125cc/hr   Continuous fetal monitoring and tocometry   Analgesia at maternal request   Vertex by TAUS  GBS negative  Induction plan sumner balloon, advance to pitocin      Not immune to hepatitis B virus  Assessment & Plan  Offer vaccine postpartum    Rubella non-immune status, antepartum  Assessment & Plan  Offer MMR postpartum         Patient of: [facility]  This patient will be an INPATIENT  and I certify the anticipated length of stay is >2 Midnights  Discussed with Dr. Leblanc      SUBJECTIVE:    Chief Complaint: I am here for my induction    HPI: Bartolo Antonio is a 23 y.o.  with an OMARI of 20[birth year], by Ultrasound at 40w3d who is being admitted for induction of labor. She reports some uterine contractions but says they are infrequent and non-painful at this time, has no LOF, and reports no VB. She states she has felt good FM.. This pregnancy is uncomplicated, though she reports feeling upper respiratory infection symptoms including fever and cough starting this morning. All other review of systems is negative.       Pregnancy Plan:  Pregnancy: [place]     Delivery Plans  Planned delivery method: Vaginal  Planned delivery location: [facility]  Planned anesthesia: Epidural  Acceptable blood products: All     Post-Delivery Plans  Feeding intentions: Breast Milk      Patient Active Problem List   Diagnosis    Rubella non-immune status, antepartum    Not immune to  hepatitis B virus    Third trimester pregnancy    40 weeks gestation of pregnancy    Encounter for induction of labor    Upper respiratory infection       OB History    Para Term  AB Living   1             SAB IAB Ectopic Multiple Live Births                  # Outcome Date GA Lbr Dylon/2nd Weight Sex Delivery Anes PTL Lv   1 Current                No past medical history on file.    No past surgical history on file.    Social History     Tobacco Use    Smoking status: Never     Passive exposure: Past    Smokeless tobacco: Never   Substance Use Topics    Alcohol use: Not Currently       No Known Allergies    Medications Prior to Admission   Medication    docusate sodium (COLACE) 100 mg capsule    ferrous sulfate 324 (65 Fe) mg    ondansetron (ZOFRAN-ODT) 4 mg disintegrating tablet    pantoprazole (PROTONIX) 40 mg tablet    Prenatal Vit-Fe Fumarate-FA (PRENATAL 1+1 PO)           OBJECTIVE:  Vitals:  Temp:  [100.1 °F (37.8 °C)] 100.1 °F (37.8 °C)  HR:  [120] 120  Resp:  [18] 18  BP: (105)/(64) 105/64  There is no height or weight on file to calculate BMI.     Physical Exam:  General: Mildly ill appearing, no distress  Respiratory: Unlabored breathing, occasional cough  Cardiovascular: Regular rate.  Abdomen: Soft, gravid, nontender  Fundal Height: Appropriate for gestational age.  Extremities: Warm and well perfused.  Non tender.  Psychiatric: Behavioral normal        FHT:  Baseline 150, moderate variability, 15x15 accelerations present, absent decelerations    TOCO:   Contractions q3 min      Prenatal Labs:   I have personally reviewed pertinent reports.  Blood Type:   Lab Results   Component Value Date/Time    ABO Grouping B 2023 11:35 AM   D (Rh type):   Lab Results   Component Value Date/Time    Rh Factor Positive 2023 11:35 AM   Antibody Screen:Negative  HCT/HGB:   Lab Results   Component Value Date/Time    Hematocrit 35.1 2024 08:28 PM    Hemoglobin 11.6 2024 08:28 PM   MCV:  "  Lab Results   Component Value Date/Time    MCV 92 01/27/2024 08:28 PM   Platelets:   Lab Results   Component Value Date/Time    Platelets 212 01/27/2024 08:28 PM   1 hour Glucola:   Lab Results   Component Value Date/Time    Glucose 103 10/30/2023 11:05 AM   Varicella: unknown  Rubella:   Lab Results   Component Value Date/Time    Rubella IgG Quant <10.0 (L) 06/21/2023 11:35 AM   VDRL/RPR: Non reactive  Urine Culture/Screen:   Lab Results   Component Value Date/Time    Urine Culture No Growth <1000 cfu/mL 08/09/2023 11:12 AM   Hep B:   Lab Results   Component Value Date/Time    Hepatitis B Surface Ag Non-reactive 06/21/2023 11:35 AM   Hep C: Non reactive  HIV: Non reactive  Chlamydia: Negative  Gonorrhea:   Lab Results   Component Value Date/Time    N gonorrhoeae, DNA Probe Negative 07/11/2023 02:47 PM    Group B Strep:    Lab Results   Component Value Date/Time    Strep Grp B PCR Negative 12/27/2023 02:52 PM            Yoselin Redmond MD  1/27/2024  9:16 PM        Portions of the record may have been created with voice recognition software.  Occasional wrong word or \"sound a like\" substitutions may have occurred due to the inherent limitations of voice recognition software.  Read the chart carefully and recognize, using context, where substitutions have occurred    "

## 2024-01-28 NOTE — CASE MANAGEMENT
Case Management Progress Note    Patient name Bartolo Quintana Colon  Location /-01 MRN [id number]  : 2000 Date 2022       LOS (days): 1  Geometric Mean LOS (GMLOS) (days):   Days to GMLOS:        OBJECTIVE:        Current admission status: Inpatient  Preferred Pharmacy:   [facility]/pharmacy #[id number] - [place] [address] [address] [address]  Phone: [phone] Fax: [phone]    Primary Care Provider: No primary care provider on file.    Primary Insurance: [employer]  Secondary Insurance:     PROGRESS NOTE:    Cm advised that patient was requesting breast pump. Cm made phone call to patient who is requesting Zomee. Cm placed order for Zomee and delivered Zomee to patients room.    Patient is cleared of Case management needs and interventions at this time.

## 2024-01-28 NOTE — OB LABOR/OXYTOCIN SAFETY PROGRESS
Oxytocin Safety Progress Check Note - Bartolo Quintana Colon 23 y.o. female MRN: [id number]    Unit/Bed#: -01 Encounter: [id number]    Dose (caron-units/min) Oxytocin: 4 caron-units/min  Contraction Frequency (minutes): 2-3  Contraction Intensity: Moderate     Cervical Dilation: 8        Cervical Effacement: 90  Fetal Station: 0  Baseline Rate (FHR): 135 bpm  Fetal Heart Rate (FHT): 130 BPM  FHR Category: II               Vital Signs:   Vitals:    01/28/24 0128   BP: 121/63   Pulse: 96   Resp: 20   Temp: 99 °F (37.2 °C)   SpO2: 99%       Notes/comments:   Patient reporting increased pressure and urge to push, SVE 8/90/0. FHR category II, intermittent late deceleration noted. Continue pitocin and repositioning.    Yoselin Redmond MD 1/28/2024 4:37 AM

## 2024-01-28 NOTE — OB LABOR/OXYTOCIN SAFETY PROGRESS
Labor Progress Note - Bartolo Quintana Colon 23 y.o. female MRN: [id number]    Unit/Bed#: -01 Encounter: [id number]                Cervical Dilation: 1-2        Cervical Effacement: 80  Fetal Station: -3        FHR Category: I               Vital Signs:   Vitals:    01/27/24 2034   BP: 105/64   Pulse: (!) 120   Resp: 18   Temp: 100.1 °F (37.8 °C)       Notes/comments:   Patient is resting comfortably. Jung balloon placed as below.    PROCEDURE:  JUNG BALLOON PLACEMENT    A 24F jung with a 30cc balloon was selected, SVE was performed and cervix was located, jung was introduced over sterile gloved hands. Balloon advanced through cervix beyond the internal cervical os. A small amount amount of sterile saline solution was instilled in the balloon to confirm placement. Placement was confirmed to be beyond the internal cervical os. A total of 60cc of sterile saline solution was placed into the balloon. Pt tolerated well. Notify MD when jung dislodged.    Yoselin Redmond MD 1/27/2024 9:52 PM

## 2024-01-28 NOTE — H&P
H & P- Obstetrics   Bartolo Antonio 23 y.o. female MRN: [id number]  Unit/Bed#: -01 Encounter: [id number]      Assessment/Plan:    Bartolo is a 23 y.o.  at 40w3d admitted for an induction of labor    SVE: Cervical Dilation: 1-2  Cervical Effacement: 80  Fetal Station: -3  OB Examiner: BERTO    Upper respiratory infection  Assessment & Plan  COVID/Flu/RSV collected on Admission  Tylenol PRN    Encounter for induction of labor  Assessment & Plan  Admit to OBGYN   Clear liquid diet   F/u T&S, CBC, RPR   IVF LR 125cc/hr   Continuous fetal monitoring and tocometry   Analgesia at maternal request   Vertex by TAUS  GBS negative  Induction plan sumner balloon, advance to pitocin      Not immune to hepatitis B virus  Assessment & Plan  Offer vaccine postpartum    Rubella non-immune status, antepartum  Assessment & Plan  Offer MMR postpartum         Patient of: [facility]  This patient will be an INPATIENT  and I certify the anticipated length of stay is >2 Midnights  Discussed with Dr. Leblanc      SUBJECTIVE:    Chief Complaint: I am here for my induction    HPI: Bartolo Antonio is a 23 y.o.  with an OMARI of 2024, by Ultrasound at 40w3d who is being admitted for induction of labor. She reports some uterine contractions but says they are infrequent and non-painful at this time, has no LOF, and reports no VB. She states she has felt good FM.. This pregnancy is uncomplicated, though she reports feeling upper respiratory infection symptoms including fever and cough starting this morning. All other review of systems is negative.       Pregnancy Plan:  Pregnancy: [place]     Delivery Plans  Planned delivery method: Vaginal  Planned delivery location: [facility]  Planned anesthesia: Epidural  Acceptable blood products: All     Post-Delivery Plans  Feeding intentions: Breast Milk      Patient Active Problem List   Diagnosis    Rubella non-immune status, antepartum    Not immune to  hepatitis B virus    Third trimester pregnancy    40 weeks gestation of pregnancy    Encounter for induction of labor    Upper respiratory infection       OB History    Para Term  AB Living   1             SAB IAB Ectopic Multiple Live Births                  # Outcome Date GA Lbr Dylon/2nd Weight Sex Delivery Anes PTL Lv   1 Current                No past medical history on file.    No past surgical history on file.    Social History     Tobacco Use    Smoking status: Never     Passive exposure: Past    Smokeless tobacco: Never   Substance Use Topics    Alcohol use: Not Currently       No Known Allergies    Medications Prior to Admission   Medication    docusate sodium (COLACE) 100 mg capsule    ferrous sulfate 324 (65 Fe) mg    ondansetron (ZOFRAN-ODT) 4 mg disintegrating tablet    pantoprazole (PROTONIX) 40 mg tablet    Prenatal Vit-Fe Fumarate-FA (PRENATAL 1+1 PO)           OBJECTIVE:  Vitals:  Temp:  [100.1 °F (37.8 °C)] 100.1 °F (37.8 °C)  HR:  [120] 120  Resp:  [18] 18  BP: (105)/(64) 105/64  There is no height or weight on file to calculate BMI.     Physical Exam:  General: Mildly ill appearing, no distress  Respiratory: Unlabored breathing, occasional cough  Cardiovascular: Regular rate.  Abdomen: Soft, gravid, nontender  Fundal Height: Appropriate for gestational age.  Extremities: Warm and well perfused.  Non tender.  Psychiatric: Behavioral normal        FHT:  Baseline 150, moderate variability, 15x15 accelerations present, absent decelerations    TOCO:   Contractions q3 min      Prenatal Labs:   I have personally reviewed pertinent reports.  Blood Type:   Lab Results   Component Value Date/Time    ABO Grouping B 2023 11:35 AM   D (Rh type):   Lab Results   Component Value Date/Time    Rh Factor Positive 2023 11:35 AM   Antibody Screen:Negative  HCT/HGB:   Lab Results   Component Value Date/Time    Hematocrit 35.1 2024 08:28 PM    Hemoglobin 11.6 2024 08:28 PM   MCV:  "  Lab Results   Component Value Date/Time    MCV 92 01/27/2024 08:28 PM   Platelets:   Lab Results   Component Value Date/Time    Platelets 212 01/27/2024 08:28 PM   1 hour Glucola:   Lab Results   Component Value Date/Time    Glucose 103 10/30/2023 11:05 AM   Varicella: unknown  Rubella:   Lab Results   Component Value Date/Time    Rubella IgG Quant <10.0 (L) 06/21/2023 11:35 AM   VDRL/RPR: Non reactive  Urine Culture/Screen:   Lab Results   Component Value Date/Time    Urine Culture No Growth <1000 cfu/mL 08/09/2023 11:12 AM   Hep B:   Lab Results   Component Value Date/Time    Hepatitis B Surface Ag Non-reactive 06/21/2023 11:35 AM   Hep C: Non reactive  HIV: Non reactive  Chlamydia: Negative  Gonorrhea:   Lab Results   Component Value Date/Time    N gonorrhoeae, DNA Probe Negative 07/11/2023 02:47 PM    Group B Strep:    Lab Results   Component Value Date/Time    Strep Grp B PCR Negative 12/27/2023 02:52 PM            Yoselin Redmond MD  1/27/2024  9:25 PM        Portions of the record may have been created with voice recognition software.  Occasional wrong word or \"sound a like\" substitutions may have occurred due to the inherent limitations of voice recognition software.  Read the chart carefully and recognize, using context, where substitutions have occurred    "

## 2024-01-28 NOTE — OB LABOR/OXYTOCIN SAFETY PROGRESS
Labor Progress Note - Bartolo Quintana Colon 23 y.o. female MRN: [id number]    Unit/Bed#: -01 Encounter: [id number]    Dose (caron-units/min) Oxytocin: 2 caron-units/min  Contraction Frequency (minutes): 3-5  Contraction Intensity: Mild     Cervical Dilation: 4        Cervical Effacement: 80  Fetal Station: -2  Baseline Rate (FHR): 150 bpm     FHR Category: 1               Vital Signs:   Vitals:    01/27/24 2034   BP: 105/64   Pulse: (!) 120   Resp: 18   Temp: 100.1 °F (37.8 °C)       Notes/comments:   Flu test positive.  Patient informed and agreeable to Tamiflu (ordered).  FHT category 1 with contractions every 5-6 min.  Clancy balloon dislodged.  SVE 4/80/-2.  Will start Pitocin as previously planned and plan for likely AROM at next check.  Dr. Leblanc aware.    Patient subsequently had fever of 100.5 F.  Tylenol given.  Will obtain EKG, but tachycardia is likely 2/2 fever & flu.  Patient denies chest pain, SOB, or palpitations.        Nneka Carmona MD 1/27/2024 10:59 PM

## 2024-01-28 NOTE — ASSESSMENT & PLAN NOTE
Lochia WNL   Recovering well   Appropriate bowel and bladder function   Pain well controlled   Tolerating diet   Breastfeeding  Ambulating without issues   No lower extremity tenderness  GBS negative   B+  PP contraception: condoms  Amenable to receiving hep b and MMR vaccine  Hgb 7.5, venofer ordered

## 2024-01-28 NOTE — DISCHARGE SUMMARY
Discharge Summary - OB/GYN  Bartolo Antonio 23 y.o. female MRN: [id number]  Unit/Bed#: -01 Encounter: [id number]    Admission Date: 2024     Discharge Date: 24      Admitting Attending: Mita Leblanc MD    Delivering Attending: Dr. Leblanc    Discharging Attending: Dr. Fox    Principal Diagnosis: Pregnancy at 40w4d    Secondary Diagnosis:  Influenza A positive  Hep B non-immune  Rubella non-immune    Procedures:  Spontaneous Vaginal Delivery  First degree perineal laceration repair    Anesthesia: none    Hospital course:  Ms. Bartolo Antonio is a 23 y.o.  at 40w4d. She presented to labor and delivery for scheduled eIOL. Her pregnancy was complicated by the above-mentioned problems. On admission, cervicla exam was 1.5/80/-3.  She was induced with Clancy balloon, and when it came out, she was 4/80/-2.  Pitocin was started, and she was subsequently ruptured for clear blood-tinged fluid. She declined epidural and progressed to complete dilation and then began to push.    She delivered a viable female  on 2024 at 0523. Weight 7lbs 12 oz via normal spontaneous vaginal delivery. She sustained a first degree perineal laceration during delivery which was adequately repaired. Apgars were 8 (1 min) and 9 (5 min).  was transferred to  nursery. Patient tolerated the procedure well.     Her post-delivery course was complicated by Influenza A positive on Tamiflu (per patient symptomatically improving, overall feels well), as well as postpartum hemorrhage (QBL 835mL, received pitocin/methergine/hemabate/TXA).   She received Venofer prior to discharge.       Her postpartum pain was well controlled with oral analgesics. Postpartum contraception is condoms.    On day of discharge, she was ambulating and able to reasonably perform all ADLs. She was voiding and had appropriate bowel function. Pain was well controlled. She was discharged home on postpartum day #2 without  complications. Patient was instructed to follow up with her OB as an outpatient and was given appropriate warnings to call provider if she develops signs of infection or uncontrolled pain.    Complications: none apparent    Condition at discharge: good     Discharge instructions/Information to patient and family:   See after visit summary for information provided to patient and family.      Provisions for Follow-Up Care:  See after visit summary for information related to follow-up care and any pertinent home health orders.      Disposition: See After Visit Summary for discharge disposition information.    Planned Readmission: No    Discharge medications and instructions:   Please see AVS for full list of medications upon discharge.

## 2024-01-29 LAB
ERYTHROCYTE [DISTWIDTH] IN BLOOD BY AUTOMATED COUNT: 13.8 % (ref 11.6–15.1)
HCT VFR BLD AUTO: 23.1 % (ref 34.8–46.1)
HGB BLD-MCNC: 7.5 G/DL (ref 11.5–15.4)
MCH RBC QN AUTO: 30.9 PG (ref 26.8–34.3)
MCHC RBC AUTO-ENTMCNC: 32.5 G/DL (ref 31.4–37.4)
MCV RBC AUTO: 95 FL (ref 82–98)
PLATELET # BLD AUTO: 192 THOUSANDS/UL (ref 149–390)
PMV BLD AUTO: 10.6 FL (ref 8.9–12.7)
RBC # BLD AUTO: 2.43 MILLION/UL (ref 3.81–5.12)
WBC # BLD AUTO: 10.85 THOUSAND/UL (ref 4.31–10.16)

## 2024-01-29 PROCEDURE — 85027 COMPLETE CBC AUTOMATED: CPT

## 2024-01-29 RX ADMIN — IBUPROFEN 600 MG: 600 TABLET ORAL at 10:43

## 2024-01-29 RX ADMIN — OSELTAMIVIR PHOSPHATE 75 MG: 75 CAPSULE ORAL at 10:37

## 2024-01-29 RX ADMIN — ACETAMINOPHEN 975 MG: 325 TABLET, FILM COATED ORAL at 00:17

## 2024-01-29 RX ADMIN — IRON SUCROSE 200 MG: 20 INJECTION, SOLUTION INTRAVENOUS at 14:12

## 2024-01-29 RX ADMIN — GUAIFENESIN 600 MG: 600 TABLET ORAL at 10:36

## 2024-01-29 RX ADMIN — IBUPROFEN 600 MG: 600 TABLET ORAL at 16:19

## 2024-01-29 RX ADMIN — GUAIFENESIN 600 MG: 600 TABLET ORAL at 20:52

## 2024-01-29 RX ADMIN — SENNOSIDES 8.6 MG: 8.6 TABLET, FILM COATED ORAL at 10:36

## 2024-01-29 RX ADMIN — OSELTAMIVIR PHOSPHATE 75 MG: 75 CAPSULE ORAL at 20:52

## 2024-01-29 NOTE — LACTATION NOTE
"This note was copied from a baby's chart.  Follow up Lactation: mom called for help with latch. Mom wants to breastfeed and baby is currently latched in the cradle hold on the left breast. NNS noted. Mom states some \"fluttering\" feeling on the breast. FOB interprets.     Ed. On positioning and alignment. Demonstration and teach back  of nipple to nose, chin deep into the breast tissue to allow for a long neck and deeper latch. Ed. On how baby breathes at the breast. 5-7 suck burst and swallow. Mom wants to see constant swallowing. Ed on breathing and muscle breaks.     Demonstrated cross-cradle, laid back and football. Enc. Mom to continue to offer both breasts. Ed. On if mom doesn't feel like latch is achieved to use mix feeding. Enc. Mom to latch to one breast for 15-20 min. If mom doesn't feel movement, may syringe feed formula. Bring baby to the opposite breast for NNS.     Ed. On cluster feeding, Ed. On nutrition and enc. Mom to keep herself hydrated.     Enc. S2S for feeds.     Enc. To call lactation.    Education on positioning and alignment. Mom is encouraged to:     - Bring baby up to the breast (use of pillows to elevate so baby's torso is against mom's breasts)   - Skin to skin for feedings with top hand exposed to show signs of satiation   - Chin deep into breast tissue (make baby look up to the nipple)   - nose aligned to the nipple   -Wait for wide gape, drag chin on the breast so nipple is aimed at the upper, back palate  - Cheek should be touching breast   - Deep, firm hold of baby with ear, shoulder, hip alignment    Mom's nipple everts with stimulation. With nipple compression, short shank noted. Education on ways to elongate the nipple: Hand expression, breast shells, manual and electric pump stimulation.     All babies are born to breastfeed due to upturned nose and flared nostrils. Mom will always see tip of nose. Babies will unlatch when they can't breathe.     Discussed 2nd night syndrome and " ways to calm infant. Hand out given. Information on hand expression given. Discussed benefits of knowing how to manually express breast including stimulating milk supply, softening nipple for latch and evacuating breast in the event of engorgement.    Education on alternative feeding methods. Encouraged to call lactation for additional assistance with feedings.    Mix Feeds:   Start every feeding at the breast. Offer both breasts or one breast and use breast compressions to achieve active suckling. Once baby is not actively suckling, bring baby in upright position and offer expressed milk and/or artificial supplementation via alternative feeding method (syringe, finger, paced bottle feeding). Burp frequently between breasts and during paced bottle feeding. Once feed is complete, place baby back on breast for on-nutritive suck. Pump after the feeding session to supplement with expressed milk at next feed.

## 2024-01-29 NOTE — PLAN OF CARE
Problem: PAIN - ADULT  Goal: Verbalizes/displays adequate comfort level or baseline comfort level  Description: Interventions:  - Encourage patient to monitor pain and request assistance  - Assess pain using appropriate pain scale  - Administer analgesics based on type and severity of pain and evaluate response  - Implement non-pharmacological measures as appropriate and evaluate response  - Consider cultural and social influences on pain and pain management  - Notify physician/advanced practitioner if interventions unsuccessful or patient reports new pain  Outcome: Progressing     Problem: INFECTION - ADULT  Goal: Absence or prevention of progression during hospitalization  Description: INTERVENTIONS:  - Assess and monitor for signs and symptoms of infection  - Monitor lab/diagnostic results  - Monitor all insertion sites, i.e. indwelling lines, tubes, and drains  - Monitor endotracheal if appropriate and nasal secretions for changes in amount and color  - [place] appropriate cooling/warming therapies per order  - Administer medications as ordered  - Instruct and encourage patient and family to use good hand hygiene technique  - Identify and instruct in appropriate isolation precautions for identified infection/condition  Outcome: Progressing     Problem: SAFETY ADULT  Goal: Patient will remain free of falls  Description: INTERVENTIONS:  - Educate patient/family on patient safety including physical limitations  - Instruct patient to call for assistance with activity   - Consult OT/PT to assist with strengthening/mobility   - Keep Call bell within reach  - Keep bed low and locked with side rails adjusted as appropriate  - Keep care items and personal belongings within reach  - Initiate and maintain comfort rounds  - Make Fall Risk Sign visible to staff  - Apply yellow socks and bracelet for high fall risk patients  - Consider moving patient to room near nurses station  Outcome: Progressing  Goal: Maintain or  return to baseline ADL function  Description: INTERVENTIONS:  -  Assess patient's ability to carry out ADLs; assess patient's baseline for ADL function and identify physical deficits which impact ability to perform ADLs (bathing, care of mouth/teeth, toileting, grooming, dressing, etc.)  - Assess/evaluate cause of self-care deficits   - Assess range of motion  - Assess patient's mobility; develop plan if impaired  - Assess patient's need for assistive devices and provide as appropriate  - Encourage maximum independence but intervene and supervise when necessary  - Involve family in performance of ADLs  - Assess for home care needs following discharge   - Consider OT consult to assist with ADL evaluation and planning for discharge  - Provide patient education as appropriate  Outcome: Progressing  Goal: Maintains/Returns to pre admission functional level  Description: INTERVENTIONS:  - Perform AM-PAC 6 Click Basic Mobility/ Daily Activity assessment daily.  - Set and communicate daily mobility goal to care team and patient/family/caregiver.   - Collaborate with rehabilitation services on mobility goals if consulted  - Out of bed for toileting  - Record patient progress and toleration of activity level   Outcome: Progressing     Problem: Knowledge Deficit  Goal: Patient/family/caregiver demonstrates understanding of disease process, treatment plan, medications, and discharge instructions  Description: Complete learning assessment and assess knowledge base.  Interventions:  - Provide teaching at level of understanding  - Provide teaching via preferred learning methods  Outcome: Progressing     Problem: DISCHARGE PLANNING  Goal: Discharge to home or other facility with appropriate resources  Description: INTERVENTIONS:  - Identify barriers to discharge w/patient and caregiver  - Arrange for needed discharge resources and transportation as appropriate  - Identify discharge learning needs (meds, wound care, etc.)  -  Arrange for interpretive services to assist at discharge as needed  - Refer to Case Management Department for coordinating discharge planning if the patient needs post-hospital services based on physician/advanced practitioner order or complex needs related to functional status, cognitive ability, or social support system  Outcome: Progressing     Problem: POSTPARTUM  Goal: Experiences normal postpartum course  Description: INTERVENTIONS:  - Monitor maternal vital signs  - Assess uterine involution and lochia  Outcome: Progressing  Goal: Appropriate maternal -  bonding  Description: INTERVENTIONS:  - Identify family support  - Assess for appropriate maternal/infant bonding   -Encourage maternal/infant bonding opportunities  - Referral to  or  as needed  Outcome: Progressing  Goal: Establishment of infant feeding pattern  Description: INTERVENTIONS:  - Assess breast/bottle feeding  - Refer to lactation as needed  Outcome: Progressing  Goal: Incision(s), wounds(s) or drain site(s) healing without S/S of infection  Description: INTERVENTIONS  - Assess and document dressing, incision, wound bed, drain sites and surrounding tissue  - Provide patient and family education  Outcome: Progressing

## 2024-01-29 NOTE — PROGRESS NOTES
Obstetrics Progress Note  Bartolo Quintana Colon 23 y.o. female MRN: [id number]  Unit/Bed#:  306-01 Encounter: [id number]    Assessment/Plan:  Postpartum Day #1 s/p . Stable. Baby in room. By issue:  *  (spontaneous vaginal delivery)  Assessment & Plan  Lochia WNL   Recovering well   Appropriate bowel and bladder function   Pain well controlled   Tolerating diet   Breastfeeding  Ambulating without issues   No lower extremity tenderness  GBS negative   B+  PP contraception: condoms  Amenable to receiving hep b and MMR vaccine      Upper respiratory infection  Assessment & Plan  COVID/Flu/RSV collected on Admission  Tylenol PRN    Not immune to hepatitis B virus  Assessment & Plan  Amenable to receiving Hep B vax      Rubella non-immune status, antepartum  Assessment & Plan  Amenable to MMR postpartum    Anemia during pregnancy in third trimester-resolved as of 2024  Assessment & Plan  Follow up admission hemaglobin      Anticipate discharge Post partum day 2.    Subjective/Objective   Chief Complaint:   Post delivery day 1    Subjective:   Pain: well controlled. Tolerating PO: yes. Voiding: yes. Flatus: yes, no bm yet. Ambulating: yes. Chest pain: no. Shortness of breath: no. Leg pain: no. Lochia: within normal limits. Infant feeding: breastfeeding. Patient amenable to receiving both hep b and MMR vaccines postpartum. Planning to use condoms for contraception.    Objective:   Vitals:   Temp:  [98.1 °F (36.7 °C)-99.1 °F (37.3 °C)] 99.1 °F (37.3 °C)  HR:  [] 100  Resp:  [18-20] 18  BP: ()/(55-75) 98/63       Intake/Output Summary (Last 24 hours) at 2024 0650  Last data filed at 2024 1149  Gross per 24 hour   Intake --   Output 500 ml   Net -500 ml       Lab Results   Component Value Date    WBC 9.80 2024    HGB 11.6 2024    HCT 35.1 2024    MCV 92 2024     2024       Physical Exam:   General: alert and oriented x3, in no apparent  distress  Cardiovascular: regular rate, regular rhythm  Pulmonary: normal effort, clear to auscultation  Abdomen: Soft, non-tender, non-distended, no rebound or guarding. Uterine fundus firm and non-tender, at the umbilicus   Extremities: Non tender     Ora Queen MD  [facility] Medicine PGY1  1/29/2024, 6:50 AM

## 2024-01-29 NOTE — LACTATION NOTE
This note was copied from a baby's chart.  Follow up Lactation: mom and FOB both have dx of influenza. Parents are not feeling well and introduced syringe with formula overnight. Mom states last feed was at 10:30. Mom complains of nipple tenderness and pain.     Upon visual assessment of the breasts, R nipple has blister on the nipple face. Lanolin has been provided    Verbal education on positioning closer to the breast. Verbal education on how to achieve a deep latch.     Enc. Hand expression if mom is stimulating the breast with a latch.     Enc. To rest between feeds and continue with fluid intake.     Enc. To call lactation for next feeding.     Education on alternative feeding methods. Mom is using syringe / cup for formula. Encouraged to call lactation for additional assistance with feedings.    To help your nipples heal, in addition to paying close attention to latch and positioning, apply protective ointment after feeding or pumping and cover with an occlusive dressing.    Provided education on alignment of nose to breast; bring baby to breast and not breast to baby; support head with opp. Hand in cross cradle; use pillows to lift baby to be belly to belly; ear, shoulder, hip alignment; Support mother's back and place self in comfortable position to support bringing baby to the breast. Shoulders should be down and away from ears.    Information on hand expression given. Discussed benefits of knowing how to manually express breast including stimulating milk supply, softening nipple for latch and evacuating breast in the event of engorgement.    Encouraged parents to call for assistance, questions, and concerns about breastfeeding.  Extension provided.

## 2024-01-29 NOTE — PLAN OF CARE
Problem: POSTPARTUM  Goal: Experiences normal postpartum course  Description: INTERVENTIONS:  - Monitor maternal vital signs  - Assess uterine involution and lochia  Outcome: Progressing  Goal: Appropriate maternal -  bonding  Description: INTERVENTIONS:  - Identify family support  - Assess for appropriate maternal/infant bonding   -Encourage maternal/infant bonding opportunities  - Referral to  or  as needed  Outcome: Progressing  Goal: Establishment of infant feeding pattern  Description: INTERVENTIONS:  - Assess breast/bottle feeding  - Refer to lactation as needed  Outcome: Progressing  Goal: Incision(s), wounds(s) or drain site(s) healing without S/S of infection  Description: INTERVENTIONS  - Assess and document dressing, incision, wound bed, drain sites and surrounding tissue  - Provide patient and family education  - Perform skin care/dressing changes every   Outcome: Progressing     Problem: PAIN - ADULT  Goal: Verbalizes/displays adequate comfort level or baseline comfort level  Description: Interventions:  - Encourage patient to monitor pain and request assistance  - Assess pain using appropriate pain scale  - Administer analgesics based on type and severity of pain and evaluate response  - Implement non-pharmacological measures as appropriate and evaluate response  - Consider cultural and social influences on pain and pain management  - Notify physician/advanced practitioner if interventions unsuccessful or patient reports new pain  Outcome: Progressing     Problem: INFECTION - ADULT  Goal: Absence or prevention of progression during hospitalization  Description: INTERVENTIONS:  - Assess and monitor for signs and symptoms of infection  - Monitor lab/diagnostic results  - Monitor all insertion sites, i.e. indwelling lines, tubes, and drains  - Monitor endotracheal if appropriate and nasal secretions for changes in amount and color  - [place] appropriate cooling/warming  therapies per order  - Administer medications as ordered  - Instruct and encourage patient and family to use good hand hygiene technique  - Identify and instruct in appropriate isolation precautions for identified infection/condition  Outcome: Progressing     Problem: SAFETY ADULT  Goal: Patient will remain free of falls  Description: INTERVENTIONS:  - Educate patient/family on patient safety including physical limitations  - Instruct patient to call for assistance with activity   - Consult OT/PT to assist with strengthening/mobility   - Keep Call bell within reach  - Keep bed low and locked with side rails adjusted as appropriate  - Keep care items and personal belongings within reach  - Initiate and maintain comfort rounds  - Make Fall Risk Sign visible to staff  - Offer Toileting every  Hours, in advance of need  - Initiate/Maintain alarm  - Obtain necessary fall risk management equipment:  - Apply yellow socks and bracelet for high fall risk patients  - Consider moving patient to room near nurses station  Outcome: Progressing  Goal: Maintain or return to baseline ADL function  Description: INTERVENTIONS:  -  Assess patient's ability to carry out ADLs; assess patient's baseline for ADL function and identify physical deficits which impact ability to perform ADLs (bathing, care of mouth/teeth, toileting, grooming, dressing, etc.)  - Assess/evaluate cause of self-care deficits   - Assess range of motion  - Assess patient's mobility; develop plan if impaired  - Assess patient's need for assistive devices and provide as appropriate  - Encourage maximum independence but intervene and supervise when necessary  - Involve family in performance of ADLs  - Assess for home care needs following discharge   - Consider OT consult to assist with ADL evaluation and planning for discharge  - Provide patient education as appropriate  Outcome: Progressing  Goal: Maintains/Returns to pre admission functional level  Description:  INTERVENTIONS:  - Perform AM-PAC 6 Click Basic Mobility/ Daily Activity assessment daily.  - Set and communicate daily mobility goal to care team and patient/family/caregiver.   - Collaborate with rehabilitation services on mobility goals if consulted  - Perform Range of Motion  times a day.  - Reposition patient every  hours.  - Dangle patient  times a day  - Stand patient times a day  - Ambulate patient  times a day  - Out of bed to chair  times a day   - Out of bed for meal times a day  - Out of bed for toileting  - Record patient progress and toleration of activity level   Outcome: Progressing     Problem: Knowledge Deficit  Goal: Patient/family/caregiver demonstrates understanding of disease process, treatment plan, medications, and discharge instructions  Description: Complete learning assessment and assess knowledge base.  Interventions:  - Provide teaching at level of understanding  - Provide teaching via preferred learning methods  Outcome: Progressing     Problem: DISCHARGE PLANNING  Goal: Discharge to home or other facility with appropriate resources  Description: INTERVENTIONS:  - Identify barriers to discharge w/patient and caregiver  - Arrange for needed discharge resources and transportation as appropriate  - Identify discharge learning needs (meds, wound care, etc.)  - Arrange for interpretive services to assist at discharge as needed  - Refer to Case Management Department for coordinating discharge planning if the patient needs post-hospital services based on physician/advanced practitioner order or complex needs related to functional status, cognitive ability, or social support system  Outcome: Progressing     Problem: ALTERATION IN THE BREAST  Goal: Optimize infant feeding at the breast  Description: INTERVENTIONS:  - Latch, breast and nipple assessment  - Assess prior breast feeding history  - Hand expression of breast milk  - For cracked, bleeding and or sore nipples reassess latch, treat  damaged nipple  -Educate mother on feeding cues  -Positioning/latch techniques  Outcome: Progressing     Problem: INADEQUATE LATCH, SUCK OR SWALLOW  Goal: Demonstrate ability to latch and sustain latch, audible swallowing and satiety  Description: INTERVENTIONS:  - Assess oral anatomy, notify Physician/AP for abnormal findings  - Establish milk expression  - Maximize feeding opportunity (skin to skin, behavioral state)  - Position/latch techniques  - Discourage use of pacifier-artificial nipple  - Mechanical pumping  - Nipple Shield  - Supplemental formula feeding (Physician/AP order)  - Alternative feeding method  Outcome: Progressing

## 2024-01-29 NOTE — PLAN OF CARE
Problem: PAIN - ADULT  Goal: Verbalizes/displays adequate comfort level or baseline comfort level  Description: Interventions:  - Encourage patient to monitor pain and request assistance  - Assess pain using appropriate pain scale  - Administer analgesics based on type and severity of pain and evaluate response  - Implement non-pharmacological measures as appropriate and evaluate response  - Consider cultural and social influences on pain and pain management  - Notify physician/advanced practitioner if interventions unsuccessful or patient reports new pain  Outcome: Progressing     Problem: INFECTION - ADULT  Goal: Absence or prevention of progression during hospitalization  Description: INTERVENTIONS:  - Assess and monitor for signs and symptoms of infection  - Monitor lab/diagnostic results  - Monitor all insertion sites, i.e. indwelling lines, tubes, and drains  - Monitor endotracheal if appropriate and nasal secretions for changes in amount and color  - [place] appropriate cooling/warming therapies per order  - Administer medications as ordered  - Instruct and encourage patient and family to use good hand hygiene technique  - Identify and instruct in appropriate isolation precautions for identified infection/condition  Outcome: Progressing     Problem: SAFETY ADULT  Goal: Patient will remain free of falls  Description: INTERVENTIONS:  - Educate patient/family on patient safety including physical limitations  - Instruct patient to call for assistance with activity   - Consult OT/PT to assist with strengthening/mobility   - Keep Call bell within reach  - Keep bed low and locked with side rails adjusted as appropriate  - Keep care items and personal belongings within reach  - Initiate and maintain comfort rounds  - Make Fall Risk Sign visible to staff  - Offer Toileting every  Hours, in advance of need  - Initiate/Maintain alarm  - Obtain necessary fall risk management equipment:   - Apply yellow socks and  bracelet for high fall risk patients  - Consider moving patient to room near nurses station  Outcome: Progressing  Goal: Maintain or return to baseline ADL function  Description: INTERVENTIONS:  -  Assess patient's ability to carry out ADLs; assess patient's baseline for ADL function and identify physical deficits which impact ability to perform ADLs (bathing, care of mouth/teeth, toileting, grooming, dressing, etc.)  - Assess/evaluate cause of self-care deficits   - Assess range of motion  - Assess patient's mobility; develop plan if impaired  - Assess patient's need for assistive devices and provide as appropriate  - Encourage maximum independence but intervene and supervise when necessary  - Involve family in performance of ADLs  - Assess for home care needs following discharge   - Consider OT consult to assist with ADL evaluation and planning for discharge  - Provide patient education as appropriate  Outcome: Progressing  Goal: Maintains/Returns to pre admission functional level  Description: INTERVENTIONS:  - Perform AM-PAC 6 Click Basic Mobility/ Daily Activity assessment daily.  - Set and communicate daily mobility goal to care team and patient/family/caregiver.   - Collaborate with rehabilitation services on mobility goals if consulted  - Perform Range of Motion  times a day.  - Reposition patient every  hours.  - Dangle patient  times a day  - Stand patient  times a day  - Ambulate patient  times a day  - Out of bed to chair  times a day   - Out of bed for meals  times a day  - Out of bed for toileting  - Record patient progress and toleration of activity level   Outcome: Progressing     Problem: Knowledge Deficit  Goal: Patient/family/caregiver demonstrates understanding of disease process, treatment plan, medications, and discharge instructions  Description: Complete learning assessment and assess knowledge base.  Interventions:  - Provide teaching at level of understanding  - Provide teaching via  preferred learning methods  Outcome: Progressing     Problem: DISCHARGE PLANNING  Goal: Discharge to home or other facility with appropriate resources  Description: INTERVENTIONS:  - Identify barriers to discharge w/patient and caregiver  - Arrange for needed discharge resources and transportation as appropriate  - Identify discharge learning needs (meds, wound care, etc.)  - Arrange for interpretive services to assist at discharge as needed  - Refer to Case Management Department for coordinating discharge planning if the patient needs post-hospital services based on physician/advanced practitioner order or complex needs related to functional status, cognitive ability, or social support system  Outcome: Progressing     Problem: POSTPARTUM  Goal: Experiences normal postpartum course  Description: INTERVENTIONS:  - Monitor maternal vital signs  - Assess uterine involution and lochia  Outcome: Progressing  Goal: Appropriate maternal -  bonding  Description: INTERVENTIONS:  - Identify family support  - Assess for appropriate maternal/infant bonding   -Encourage maternal/infant bonding opportunities  - Referral to  or  as needed  Outcome: Progressing  Goal: Establishment of infant feeding pattern  Description: INTERVENTIONS:  - Assess breast/bottle feeding  - Refer to lactation as needed  Outcome: Progressing  Goal: Incision(s), wounds(s) or drain site(s) healing without S/S of infection  Description: INTERVENTIONS  - Assess and document dressing, incision, wound bed, drain sites and surrounding tissue  - Provide patient and family education  - Perform skin care/dressing changes every   Outcome: Progressing

## 2024-01-30 VITALS
RESPIRATION RATE: 18 BRPM | DIASTOLIC BLOOD PRESSURE: 56 MMHG | TEMPERATURE: 98.5 F | OXYGEN SATURATION: 98 % | HEART RATE: 88 BPM | SYSTOLIC BLOOD PRESSURE: 108 MMHG | HEIGHT: 61 IN | BODY MASS INDEX: 25.49 KG/M2 | WEIGHT: 135 LBS

## 2024-01-30 LAB
DME PARACHUTE DELIVERY DATE ACTUAL: NORMAL
DME PARACHUTE DELIVERY DATE REQUESTED: NORMAL
DME PARACHUTE DELIVERY NOTE: NORMAL
DME PARACHUTE ITEM DESCRIPTION: NORMAL
DME PARACHUTE ORDER STATUS: NORMAL
DME PARACHUTE SUPPLIER NAME: NORMAL
DME PARACHUTE SUPPLIER PHONE: NORMAL

## 2024-01-30 PROCEDURE — 99024 POSTOP FOLLOW-UP VISIT: CPT | Performed by: OBSTETRICS & GYNECOLOGY

## 2024-01-30 PROCEDURE — NC001 PR NO CHARGE: Performed by: OBSTETRICS & GYNECOLOGY

## 2024-01-30 RX ORDER — IBUPROFEN 600 MG/1
600 TABLET ORAL EVERY 6 HOURS
Qty: 30 TABLET | Refills: 0 | Status: SHIPPED | OUTPATIENT
Start: 2024-01-30

## 2024-01-30 RX ORDER — ACETAMINOPHEN 325 MG/1
975 TABLET ORAL EVERY 8 HOURS PRN
Qty: 30 TABLET | Refills: 0 | Status: SHIPPED | OUTPATIENT
Start: 2024-01-30

## 2024-01-30 RX ADMIN — OSELTAMIVIR PHOSPHATE 75 MG: 75 CAPSULE ORAL at 08:05

## 2024-01-30 RX ADMIN — GUAIFENESIN 600 MG: 600 TABLET ORAL at 08:05

## 2024-01-30 RX ADMIN — SENNOSIDES 8.6 MG: 8.6 TABLET, FILM COATED ORAL at 08:05

## 2024-01-30 NOTE — PLAN OF CARE
Problem: PAIN - ADULT  Goal: Verbalizes/displays adequate comfort level or baseline comfort level  Description: Interventions:  - Encourage patient to monitor pain and request assistance  - Assess pain using appropriate pain scale  - Administer analgesics based on type and severity of pain and evaluate response  - Implement non-pharmacological measures as appropriate and evaluate response  - Consider cultural and social influences on pain and pain management  - Notify physician/advanced practitioner if interventions unsuccessful or patient reports new pain  Outcome: Adequate for Discharge     Problem: INFECTION - ADULT  Goal: Absence or prevention of progression during hospitalization  Description: INTERVENTIONS:  - Assess and monitor for signs and symptoms of infection  - Monitor lab/diagnostic results  - Monitor all insertion sites, i.e. indwelling lines, tubes, and drains  - Monitor endotracheal if appropriate and nasal secretions for changes in amount and color  - [place] appropriate cooling/warming therapies per order  - Administer medications as ordered  - Instruct and encourage patient and family to use good hand hygiene technique  - Identify and instruct in appropriate isolation precautions for identified infection/condition  Outcome: Adequate for Discharge     Problem: SAFETY ADULT  Goal: Patient will remain free of falls  Description: INTERVENTIONS:  - Educate patient/family on patient safety including physical limitations  - Instruct patient to call for assistance with activity   - Consult OT/PT to assist with strengthening/mobility   - Keep Call bell within reach  - Keep bed low and locked with side rails adjusted as appropriate  - Keep care items and personal belongings within reach  - Initiate and maintain comfort rounds  - Make Fall Risk Sign visible to staff  - Offer Toileting every  Hours, in advance of need  - Initiate/Maintain alarm  - Obtain necessary fall risk management equipment:   - Apply  yellow socks and bracelet for high fall risk patients  - Consider moving patient to room near nurses station  Outcome: Adequate for Discharge  Goal: Maintain or return to baseline ADL function  Description: INTERVENTIONS:  -  Assess patient's ability to carry out ADLs; assess patient's baseline for ADL function and identify physical deficits which impact ability to perform ADLs (bathing, care of mouth/teeth, toileting, grooming, dressing, etc.)  - Assess/evaluate cause of self-care deficits   - Assess range of motion  - Assess patient's mobility; develop plan if impaired  - Assess patient's need for assistive devices and provide as appropriate  - Encourage maximum independence but intervene and supervise when necessary  - Involve family in performance of ADLs  - Assess for home care needs following discharge   - Consider OT consult to assist with ADL evaluation and planning for discharge  - Provide patient education as appropriate  Outcome: Adequate for Discharge  Goal: Maintains/Returns to pre admission functional level  Description: INTERVENTIONS:  - Perform AM-PAC 6 Click Basic Mobility/ Daily Activity assessment daily.  - Set and communicate daily mobility goal to care team and patient/family/caregiver.   - Collaborate with rehabilitation services on mobility goals if consulted  - Perform Range of Motion  times a day.  - Reposition patient every  hours.  - Dangle patient  times a day  - Stand patient  times a day  - Ambulate patient  times a day  - Out of bed to chair  times a day   - Out of bed for meals  times a day  - Out of bed for toileting  - Record patient progress and toleration of activity level   Outcome: Adequate for Discharge     Problem: Knowledge Deficit  Goal: Patient/family/caregiver demonstrates understanding of disease process, treatment plan, medications, and discharge instructions  Description: Complete learning assessment and assess knowledge base.  Interventions:  - Provide teaching at  level of understanding  - Provide teaching via preferred learning methods  Outcome: Adequate for Discharge     Problem: DISCHARGE PLANNING  Goal: Discharge to home or other facility with appropriate resources  Description: INTERVENTIONS:  - Identify barriers to discharge w/patient and caregiver  - Arrange for needed discharge resources and transportation as appropriate  - Identify discharge learning needs (meds, wound care, etc.)  - Arrange for interpretive services to assist at discharge as needed  - Refer to Case Management Department for coordinating discharge planning if the patient needs post-hospital services based on physician/advanced practitioner order or complex needs related to functional status, cognitive ability, or social support system  Outcome: Adequate for Discharge     Problem: POSTPARTUM  Goal: Experiences normal postpartum course  Description: INTERVENTIONS:  - Monitor maternal vital signs  - Assess uterine involution and lochia  Outcome: Adequate for Discharge  Goal: Appropriate maternal -  bonding  Description: INTERVENTIONS:  - Identify family support  - Assess for appropriate maternal/infant bonding   -Encourage maternal/infant bonding opportunities  - Referral to  or  as needed  Outcome: Adequate for Discharge  Goal: Establishment of infant feeding pattern  Description: INTERVENTIONS:  - Assess breast/bottle feeding  - Refer to lactation as needed  Outcome: Adequate for Discharge  Goal: Incision(s), wounds(s) or drain site(s) healing without S/S of infection  Description: INTERVENTIONS  - Assess and document dressing, incision, wound bed, drain sites and surrounding tissue  - Provide patient and family education  - Perform skin care/dressing changes every  Outcome: Adequate for Discharge     Problem: ALTERATION IN THE BREAST  Goal: Optimize infant feeding at the breast  Description: INTERVENTIONS:  - Latch, breast and nipple assessment  - Assess prior breast  feeding history  - Hand expression of breast milk  - For cracked, bleeding and or sore nipples reassess latch, treat damaged nipple  -Educate mother on feeding cues  -Positioning/latch techniques  Outcome: Adequate for Discharge     Problem: INADEQUATE LATCH, SUCK OR SWALLOW  Goal: Demonstrate ability to latch and sustain latch, audible swallowing and satiety  Description: INTERVENTIONS:  - Assess oral anatomy, notify Physician/AP for abnormal findings  - Establish milk expression  - Maximize feeding opportunity (skin to skin, behavioral state)  - Position/latch techniques  - Discourage use of pacifier-artificial nipple  - Mechanical pumping  - Nipple Shield  - Supplemental formula feeding (Physician/AP order)  - Alternative feeding method  Outcome: Adequate for Discharge

## 2024-01-30 NOTE — PROGRESS NOTES
Obstetrics Progress Note  Bartolo Quintana Colon 23 y.o. female MRN: [id number]  Unit/Bed#:  306-01 Encounter: [id number]    Assessment/Plan:  Postpartum Day #2 s/p . Stable. Baby in room. By issue:  *  (spontaneous vaginal delivery)  Assessment & Plan  Lochia WNL   Recovering well   Appropriate bowel and bladder function   Pain well controlled   Tolerating diet   Breastfeeding  Ambulating without issues   No lower extremity tenderness  GBS negative   B+  PP contraception: condoms  Amenable to receiving hep b and MMR vaccine  Hgb 7.5, venofer ordered      Upper respiratory infection  Assessment & Plan  Flu positive  Tylenol PRN    Not immune to hepatitis B virus  Assessment & Plan  Amenable to receiving Hep B vax, will get at her PCP office      Rubella non-immune status, antepartum  Assessment & Plan  MMR ordered    Anemia during pregnancy in third trimester-resolved as of 2024  Assessment & Plan  Follow up admission hemaglobin      Anticipate discharge Post partum day 2.    Subjective/Objective   Chief Complaint:   Post delivery day 2    Subjective:   Pain: well controlled. Tolerating PO: yes. Voiding: yes. Flatus: yes, had bm. Ambulating: yes. Chest pain: no. Shortness of breath: no. Leg pain: no. Lochia: within normal limits. Infant feeding: breast feeding.    Objective:   Vitals:   Temp:  [98.2 °F (36.8 °C)-98.6 °F (37 °C)] 98.3 °F (36.8 °C)  HR:  [] 77  Resp:  [18] 18  BP: ()/(57-70) 100/70     No intake or output data in the 24 hours ending 24 0558    Lab Results   Component Value Date    WBC 10.85 (H) 2024    HGB 7.5 (L) 2024    HCT 23.1 (L) 2024    MCV 95 2024     2024       Physical Exam:   General: alert and oriented x3, in no apparent distress  Cardiovascular: regular rate, regular rhythm  Pulmonary: normal effort, clear to auscultation  Abdomen: Soft, non-tender, non-distended, no rebound or guarding. Uterine fundus firm and non-tender,  at the umbilicus   Extremities: Non tender     Ora Queen MD  [facility] Medicine PGY1  1/30/2024, 5:58 AM

## 2024-01-30 NOTE — PLAN OF CARE
Problem: PAIN - ADULT  Goal: Verbalizes/displays adequate comfort level or baseline comfort level  Description: Interventions:  - Encourage patient to monitor pain and request assistance  - Assess pain using appropriate pain scale  - Administer analgesics based on type and severity of pain and evaluate response  - Implement non-pharmacological measures as appropriate and evaluate response  - Consider cultural and social influences on pain and pain management  - Notify physician/advanced practitioner if interventions unsuccessful or patient reports new pain  Outcome: Progressing     Problem: INFECTION - ADULT  Goal: Absence or prevention of progression during hospitalization  Description: INTERVENTIONS:  - Assess and monitor for signs and symptoms of infection  - Monitor lab/diagnostic results  - Monitor all insertion sites, i.e. indwelling lines, tubes, and drains  - Monitor endotracheal if appropriate and nasal secretions for changes in amount and color  - [place] appropriate cooling/warming therapies per order  - Administer medications as ordered  - Instruct and encourage patient and family to use good hand hygiene technique  - Identify and instruct in appropriate isolation precautions for identified infection/condition  Outcome: Progressing     Problem: SAFETY ADULT  Goal: Patient will remain free of falls  Description: INTERVENTIONS:  - Educate patient/family on patient safety including physical limitations  - Instruct patient to call for assistance with activity   - Consult OT/PT to assist with strengthening/mobility   - Keep Call bell within reach  - Keep bed low and locked with side rails adjusted as appropriate  - Keep care items and personal belongings within reach  - Initiate and maintain comfort rounds  - Make Fall Risk Sign visible to staff  - Offer Toileting every  Hours, in advance of need  - Initiate/Maintain alarm  - Obtain necessary fall risk management equipment:  - Apply yellow socks and  bracelet for high fall risk patients  - Consider moving patient to room near nurses station  Outcome: Progressing  Goal: Maintain or return to baseline ADL function  Description: INTERVENTIONS:  -  Assess patient's ability to carry out ADLs; assess patient's baseline for ADL function and identify physical deficits which impact ability to perform ADLs (bathing, care of mouth/teeth, toileting, grooming, dressing, etc.)  - Assess/evaluate cause of self-care deficits   - Assess range of motion  - Assess patient's mobility; develop plan if impaired  - Assess patient's need for assistive devices and provide as appropriate  - Encourage maximum independence but intervene and supervise when necessary  - Involve family in performance of ADLs  - Assess for home care needs following discharge   - Consider OT consult to assist with ADL evaluation and planning for discharge  - Provide patient education as appropriate  Outcome: Progressing  Goal: Maintains/Returns to pre admission functional level  Description: INTERVENTIONS:  - Perform AM-PAC 6 Click Basic Mobility/ Daily Activity assessment daily.  - Set and communicate daily mobility goal to care team and patient/family/caregiver.   - Collaborate with rehabilitation services on mobility goals if consulted  - Perform Range of Motion  times a day.  - Reposition patient every  hours.  - Dangle patient  times a day  - Stand patient  times a day  - Ambulate patient times a day  - Out of bed to chair  times a day   - Out of bed for meals  times a day  - Out of bed for toileting  - Record patient progress and toleration of activity level   Outcome: Progressing     Problem: Knowledge Deficit  Goal: Patient/family/caregiver demonstrates understanding of disease process, treatment plan, medications, and discharge instructions  Description: Complete learning assessment and assess knowledge base.  Interventions:  - Provide teaching at level of understanding  - Provide teaching via  preferred learning methods  Outcome: Progressing     Problem: DISCHARGE PLANNING  Goal: Discharge to home or other facility with appropriate resources  Description: INTERVENTIONS:  - Identify barriers to discharge w/patient and caregiver  - Arrange for needed discharge resources and transportation as appropriate  - Identify discharge learning needs (meds, wound care, etc.)  - Arrange for interpretive services to assist at discharge as needed  - Refer to Case Management Department for coordinating discharge planning if the patient needs post-hospital services based on physician/advanced practitioner order or complex needs related to functional status, cognitive ability, or social support system  Outcome: Progressing     Problem: POSTPARTUM  Goal: Experiences normal postpartum course  Description: INTERVENTIONS:  - Monitor maternal vital signs  - Assess uterine involution and lochia  Outcome: Progressing  Goal: Appropriate maternal -  bonding  Description: INTERVENTIONS:  - Identify family support  - Assess for appropriate maternal/infant bonding   -Encourage maternal/infant bonding opportunities  - Referral to  or  as needed  Outcome: Progressing  Goal: Establishment of infant feeding pattern  Description: INTERVENTIONS:  - Assess breast/bottle feeding  - Refer to lactation as needed  Outcome: Progressing  Goal: Incision(s), wounds(s) or drain site(s) healing without S/S of infection  Description: INTERVENTIONS  - Assess and document dressing, incision, wound bed, drain sites and surrounding tissue  - Provide patient and family education  - Perform skin care/dressing changes every   Outcome: Progressing     Problem: ALTERATION IN THE BREAST  Goal: Optimize infant feeding at the breast  Description: INTERVENTIONS:  - Latch, breast and nipple assessment  - Assess prior breast feeding history  - Hand expression of breast milk  - For cracked, bleeding and or sore nipples reassess latch,  treat damaged nipple  -Educate mother on feeding cues  -Positioning/latch techniques  Outcome: Progressing     Problem: INADEQUATE LATCH, SUCK OR SWALLOW  Goal: Demonstrate ability to latch and sustain latch, audible swallowing and satiety  Description: INTERVENTIONS:  - Assess oral anatomy, notify Physician/AP for abnormal findings  - Establish milk expression  - Maximize feeding opportunity (skin to skin, behavioral state)  - Position/latch techniques  - Discourage use of pacifier-artificial nipple  - Mechanical pumping  - Nipple Shield  - Supplemental formula feeding (Physician/AP order)  - Alternative feeding method  Outcome: Progressing

## 2024-01-30 NOTE — LACTATION NOTE
This note was copied from a baby's chart.  CONSULT - LACTATION  Baby Girl (Brennely) Lilian Antonio 2 days female MRN: [id number]    [facility] AN NURSERY Room / Bed: (N)/(N) Encounter: [id number]    Maternal Information     MOTHER:  Bartolo Mckeon  Maternal Age: 23 y.o.   OB History: # 1 - Date: 01/28/24, Sex: Female, Weight: 3520 g (7 lb 12.2 oz), GA: 40w4d, Delivery: Vaginal, Spontaneous, Apgar1: 8, Apgar5: 9, Living: Living, Birth Comments: None   Previouse breast reduction surgery? No    Lactation history:   Has patient previously breast fed: No   How long had patient previously breast fed:     Previous breast feeding complications:     No past surgical history on file.     Birth information:  YOB: 2022   Time of birth: 5:23 AM   Sex: female   Delivery type: Vaginal, Spontaneous   Birth Weight: 3520 g (7 lb 12.2 oz)   Percent of Weight Change: -6%     Gestational Age: 40w4d   [unfilled]    Assessment        01/30/24 0900   Lactation Consultation   Reason for Consult 20 minutes   Risk Factors Language barrier   Breasts/Nipples   Left Breast Soft   Right Breast Soft   Left Nipple Tender;Everted   Right Nipple Tender;Blisters;Everted   Intervention Lanolin;Hand expression   Breastfeeding Status Yes   Other OB Lactation Tools   Feeding Devices Syringe   Patient Follow-Up   Lactation Consult Status 2   Follow-Up Type Inpatient;Call as needed   Other OB Lactation Documentation    Additional Problem Noted Mom states baby is more alert and latching better. Did not give formula overnight. Has been latching to breast. Encouraged to call for latch assessment.  (D/C Booklet reviewed)       Feeding recommendations:  breast feed on demand    Mom states baby is more alert and latching better. Did not give formula overnight. Has been latching to breast.  Encouraged to put baby to breast first, if not latching well, mom is to give expressed milk or formula, and  then pump after session. Mom verbalized understanding. Encouraged to call for latch assessment.    Provided education of deep latch to breast by placing the nipple to the nose, dragging down to chin to achieve a wide latch. Bring baby to the breast, not breast to baby. Move your shoulders down and away from your ears. Look for ear, shoulder, hip alignment. Baby's upper and lower lip should be flanged on the breast.    Met with mother to go over discharge breastfeeding booklet including the feeding log. Emphasized 8 or more (12) feedings in a 24 hour period, what to expect for the number of diapers per day of life and the progression of properties of the  stooling pattern.    List of reasons to call a lactation consultant.  Feeding logs  Feeding cues  Hand expression  Baby's Second day (cluster feeding)  Breastfeeding and Your Lifestyle (Medications, Alcohol, Caffeine, Smoking, Street Drugs, Methadone)  First Two Weeks Survival Guide for Breastfeeding  Breast Changes  Physical Therapy  Storage and Handling of Breast milk  How to Keep Your Breast Pump Kit Clean  The Employed Breastfeeding Mother  Mixed feeding  Bottle feeding like breastfeeding (paced bottle feeding)  astfeeding and your lifestyle, storage and preparation of breast milk, how to keep you breast pump clean, the employed breastfeeding mother and paced bottle feeding handouts.     Booklet included Breastfeeding Resources for after discharge including access to the number for the [facility].        Nancy Lopez 2024 9:54 AM

## 2024-01-31 ENCOUNTER — TELEPHONE (OUTPATIENT)
Dept: OBGYN CLINIC | Facility: CLINIC | Age: 24
End: 2024-01-31

## 2024-01-31 NOTE — TELEPHONE ENCOUNTER
Transition of Care Post Partum phone call: Congratulations via  # [id number]    Date of delivery: 2024  Weeks gestation: full term 40 weeks  Type of delivery: vaginal delivery  Sex of child: female  Name of child:   Birth weight: 3520 gm   7lbs 12.2ounces  Perineum laceration: Yes   Pediatric provider:  Vaginal bleeding status: light  Urinary status: voiding WNL   Bowel movement: Yes  Incision status: NA   Pain control: acetaminophen   feeding: Breastfeeding and Using breast pump   Any baby blues: No  Scheduled for follow-up appointment: 2024

## 2024-01-31 NOTE — UTILIZATION REVIEW
"Mother and baby discharged on 2024     NOTIFICATION OF INPATIENT ADMISSION   MATERNITY/DELIVERY AUTHORIZATION REQUEST   SERVICING FACILITY:   [facility]  Parent Child Health - L&D, [place], NICU  [address] [place] [address]  Tax ID: [id number]  NPI: [id number]   ATTENDING PROVIDER:  Attending Name and NPI#: Mita Leblanc Md [[id number]]  Address: [address] [place] [address]  Phone: [phone]   ADMISSION INFORMATION:  Place of Service: Inpatient [facility]  Place of Service Code: 21  Inpatient Admission Date/Time: 24  7:37 PM  Discharge Date/Time: 2024  2:41 PM  Admitting Diagnosis Code/Description:  Encounter for full-term uncomplicated delivery [O80]     Mother: Bartolo Antonio 2000 Estimated Date of Delivery: 24  Delivering clinician: Mita Leblanc    OB History          1    Para   1    Term   1            AB        Living   1         SAB        IAB        Ectopic        Multiple   0    Live Births   1                Name & MRN:   Information for the patient's :  Lilian Antonio, Baby Girl (Bartolo) [[id number]]    Delivery Information:  Sex: female  Delivered 2024 5:23 AM by Vaginal, Spontaneous; Gestational Age: 40w4d     Measurements:  Weight: 7 lb 12.2 oz (3520 g);  Height: 19.5\"    APGAR 1 minute 5 minutes 10 minutes   Totals: 8 9       Birth Information: 23 y.o. female MRN: [id number] Unit/Bed#: -01   Birthweight: No birth weight on file. Gestational Age: <None> Delivery Type:    APGARS Totals:        UTILIZATION REVIEW CONTACT:  Syl Ramires, Utilization   Network Utilization Review Department  Phone: [phone]  Fax [phone]  Email: [email or web address]  Contact for approvals/pending authorizations, clinical reviews, and discharge.     PHYSICIAN ADVISORY SERVICES:  Medical Necessity Denial & Elqw-gl-Nocv Review  Phone: " [phone]  Fax: [phone]  Email: [email or web address]     DISCHARGE SUPPORT TEAM:  For Patients Discharge Needs & Updates  Phone: [phone] opt. 2 Fax: [phone]  Email: [email or web address]

## 2024-01-31 NOTE — TELEPHONE ENCOUNTER
LM for pt to call back [facility] to complete TCM and schedule PPD appt on or after 2/18/2024

## 2024-02-03 LAB — PLACENTA IN STORAGE: NORMAL

## 2024-02-19 ENCOUNTER — POSTPARTUM VISIT (OUTPATIENT)
Dept: OBGYN CLINIC | Facility: CLINIC | Age: 24
End: 2024-02-19

## 2024-02-19 VITALS
SYSTOLIC BLOOD PRESSURE: 118 MMHG | BODY MASS INDEX: 22.82 KG/M2 | RESPIRATION RATE: 16 BRPM | WEIGHT: 120.8 LBS | HEART RATE: 88 BPM | DIASTOLIC BLOOD PRESSURE: 78 MMHG

## 2024-02-19 DIAGNOSIS — D50.9 IRON DEFICIENCY ANEMIA, UNSPECIFIED IRON DEFICIENCY ANEMIA TYPE: Primary | ICD-10-CM

## 2024-02-19 NOTE — PROGRESS NOTES
Subjective     Bartolo Antonio is a 23 y.o. y.o. female  who presents for a postpartum visit. She is 3 weeks postpartum following a spontaneous vaginal delivery on 2024, she had a baby girl that weighed 7 pounds 12.2 ounces, Apgars were 8 and 9.  She had a postpartum hemorrhage with intermittent uterine atony requiring Pitocin Methergine and Hemabate and TXA, QBL was 835 mL.  Her hemoglobin on 2024 was 7.5, previously on 127 it was 11.6.  She did receive 1 dose of Venofer prior to her discharge she  had a second-degree laceration that was repaired.  See complicated by rubella nonimmune, hepatitis B nonimmune, positive influenza A. She denies HA's, SOB or pain in the extremities.  . I have fully reviewed the prenatal and intrapartum course. The delivery was at 2 weeks and 4 days gestational weeks. Outcome: spontaneous vaginal delivery. Anesthesia: none. Postpartum course has been uneventful. Baby's course has been uneventful. Baby is feeding by breast. Bleeding thin lochia. Bowel function is normal. Bladder function is normal. Patient is sexually active, but has not resumed sexual relations. Contraception method is abstinence. Postpartum depression screening: negative. She scored a zero.     .Depression Screening Follow-up Plan: Patient's depression screening was negative with a edinburg score of 0. Clinically patient does not have depression. No treatment is required.Depression Screening Follow-up Plan: Patient's depression screening was positive with a PHQ-2 score of . Their PHQ-9 score was . Clinically patient does not have depression. No treatment is required.    The following portions of the patient's history were reviewed and updated as appropriate: allergies, current medications, past family history, past medical history, past social history, past surgical history, and problem list.    Review of Systems  Pertinent items are noted in HPI..    Objective     /78 (BP Location: Right  arm, Patient Position: Sitting, Cuff Size: Standard)   Pulse 88   Resp 16   Wt 54.8 kg (120 lb 12.8 oz)   LMP 04/13/2023 (Exact Date)   BMI 22.82 kg/m²     General:   appears stated age, cooperative, alert normal mood and affect   Neck: Neck: normal, supple,trachea midline, no masses   Heart: regular rate and rhythm, S1, S2 normal, no murmur, click, rub or gallop   Lungs: clear to auscultation bilaterally   Breasts: Deferred denies concerns   Abdomen: soft, non-tender, without masses or organomegaly   Vulva: Deferred denies concerns   Vagina:    Urethra:    Cervix:    Uterus:    Adnexa:      Assessment/Plan     3wks postpartum exam. Pap smear not done at today's visit. Last pap was 7/11/2023    1. Contraception: condoms  2. CBC check anemia, recommend to complete in 4 weeks  3. Follow up in: 5 months for her annual  or as needed.  4. Continue iron and vitamins

## 2024-08-26 ENCOUNTER — TELEPHONE (OUTPATIENT)
Dept: OBGYN CLINIC | Facility: CLINIC | Age: 24
End: 2024-08-26

## 2024-08-26 NOTE — TELEPHONE ENCOUNTER
ID [id number]    Attempted to call, could not leave a message due to mailbox not being set up.     Patient called stating she needs an appointment for a head ache. Patient needs to be set up with a PCP. Patient also needs an annual visit with OB/GYN.     Will attempt to call again at a later time. Will also send a my chart message.

## 2024-09-09 ENCOUNTER — OFFICE VISIT (OUTPATIENT)
Dept: FAMILY MEDICINE CLINIC | Facility: CLINIC | Age: 24
End: 2024-09-09

## 2024-09-09 VITALS
HEART RATE: 70 BPM | WEIGHT: 96.2 LBS | BODY MASS INDEX: 18.16 KG/M2 | HEIGHT: 61 IN | DIASTOLIC BLOOD PRESSURE: 56 MMHG | OXYGEN SATURATION: 98 % | SYSTOLIC BLOOD PRESSURE: 101 MMHG | TEMPERATURE: 97.2 F

## 2024-09-09 DIAGNOSIS — R10.30 LOWER ABDOMINAL PAIN: Primary | ICD-10-CM

## 2024-09-09 DIAGNOSIS — N91.2 AMENORRHEA: ICD-10-CM

## 2024-09-09 DIAGNOSIS — T14.8XXA BRUISING: ICD-10-CM

## 2024-09-09 NOTE — PATIENT INSTRUCTIONS
Please use heat packs and tylenol for lower abdominal pain as needed.   Please follow up in the office in 4 weeks

## 2024-09-09 NOTE — ASSESSMENT & PLAN NOTE
Patient currently 7 months post-partum  - Exclusively breastfeeding at this time  - No vaginal bleeding at this time. Reports periods have not returned post-partum    Suspected to be secondary to breastfeeding, but will also consider other etiologies.     Plan:  Follow-up TSH and prolactin levels

## 2024-09-09 NOTE — ASSESSMENT & PLAN NOTE
Patient presenting bilateral lower abdominal pain  - Began after  in 2024  - Intermittent cramp-like in nature  - No vaginal bleeding    Likely msk in nature, also considering ovarian cyst and UTI  Less likely GI in etiology given clinical presentation    Plan:  Recommendation for supportive care with Tylenol and heating pad as needed for pain.  Transvaginal ultrasound  UA  CMP  Follow up in 4 weeks

## 2024-09-09 NOTE — PROGRESS NOTES
Ambulatory Visit  Name: Bartolo Antonio      : 2000      MRN: [id number]  Encounter Provider: Nitaz Grant DO  Encounter Date: 2024   Encounter department: [facility]    Assessment & Plan   1. Lower abdominal pain  Assessment & Plan:  Patient presenting bilateral lower abdominal pain  - Began after  in 2024  - Intermittent cramp-like in nature  - No vaginal bleeding    Likely msk in nature, also considering ovarian cyst and UTI  Less likely GI in etiology given clinical presentation    Plan:  Transvaginal ultrasound  UA  CMP  Follow up in 4 weeks  Orders:  -     Comprehensive metabolic panel; Future  -     US pelvis complete non OB; Future; Expected date: 2024  -     UA w Reflex to Microscopic w Reflex to Culture; Future  2. Bruising  Assessment & Plan:  Patient reports increased bruising on her lower extremities.   - Hx iron deficiency anemia  - No blood in stool or urine. No bleeding gums.   - No other skin or nail changes    Plan:  Follow up CBC  Orders:  -     CBC and differential; Future  3. Amenorrhea  Assessment & Plan:  Patient currently 7 months post-partum  - Exclusively breastfeeding at this time  - No vaginal bleeding at this time. Reports periods have not returned post-partum    Suspected to be secondary to breastfeeding, but will also consider other etiologies.     Plan:  Follow-up TSH and prolactin levels    Orders:  -     TSH, 3rd generation with Free T4 reflex; Future  -     Prolactin; Future       History of Present Illness     HPI  24-year-old female presenting to the office with a concern of lower abdominal pain.  She reports intermittent, cramping lower abdominal pain.  The pain has no known triggers and is not associated with food.  She denies any diarrhea, constipation, nausea, vomiting, or urinary symptoms.  The pain is worse with pressure applied to her abdomen.  She denies any burning with urination or blood in her urine,  "or urinary frequency.  She does report that her urine has had a different smell recently.  She has not tried any medications to alleviate the pain at this time.      She also reports increased bruising in her lower extremities without known cause.  She denies any other sources of bleeding, such as blood in her stool, blood in her urine, or gingival bleeding.    Review of Systems   Constitutional:  Negative for activity change, appetite change, fever and unexpected weight change.   Respiratory:  Negative for shortness of breath.    Cardiovascular:  Negative for chest pain and leg swelling.   Gastrointestinal:  Positive for abdominal pain. Negative for blood in stool, constipation, diarrhea, nausea and vomiting.   Genitourinary:  Positive for vaginal discharge (clear). Negative for dyspareunia, dysuria, flank pain, hematuria, urgency, vaginal bleeding and vaginal pain.   Skin:  Negative for rash.   Neurological:  Negative for light-headedness.     Medical History Reviewed by provider this encounter:       Objective     /56 (BP Location: Left arm, Patient Position: Sitting, Cuff Size: Standard)   Pulse 70   Temp (!) 97.2 °F (36.2 °C) (Tympanic)   Ht 5' 0.75\" (1.543 m)   Wt 43.6 kg (96 lb 3.2 oz)   SpO2 98%   BMI 18.33 kg/m²     Physical Exam  Vitals reviewed.   Constitutional:       General: She is not in acute distress.  HENT:      Head: Normocephalic and atraumatic.   Cardiovascular:      Rate and Rhythm: Normal rate and regular rhythm.   Pulmonary:      Effort: No respiratory distress.      Breath sounds: Normal breath sounds.   Abdominal:      General: Abdomen is flat. Bowel sounds are normal. There is no distension.      Palpations: Abdomen is soft.      Tenderness: There is abdominal tenderness (tenderness to deep palpation of lower quadrants). There is no right CVA tenderness, left CVA tenderness, guarding or rebound. Negative signs include Ramirez's sign and McBurney's sign.   Musculoskeletal:      " Right lower leg: No edema.      Left lower leg: No edema.   Skin:     General: Skin is warm and dry.      Findings: Bruising (brusing noted on bilateral lower extremities below the level of the knees) present.   Neurological:      Mental Status: She is alert.

## 2024-09-09 NOTE — ASSESSMENT & PLAN NOTE
Patient reports increased bruising on her lower extremities.   - Hx iron deficiency anemia  - No blood in stool or urine. No bleeding gums.   - No other skin or nail changes    Plan:  Follow up CBC

## 2024-09-15 ENCOUNTER — HOSPITAL ENCOUNTER (EMERGENCY)
Facility: HOSPITAL | Age: 24
Discharge: HOME | End: 2024-09-15
Payer: COMMERCIAL

## 2024-09-15 VITALS
RESPIRATION RATE: 18 BRPM | DIASTOLIC BLOOD PRESSURE: 58 MMHG | OXYGEN SATURATION: 97 % | BODY MASS INDEX: 18.31 KG/M2 | TEMPERATURE: 97.9 F | HEART RATE: 86 BPM | WEIGHT: 96.12 LBS | SYSTOLIC BLOOD PRESSURE: 106 MMHG

## 2024-09-15 DIAGNOSIS — N64.59 ENGORGED BREASTS: Primary | ICD-10-CM

## 2024-09-15 PROCEDURE — 99283 EMERGENCY DEPT VISIT LOW MDM: CPT

## 2024-09-15 PROCEDURE — 99282 EMERGENCY DEPT VISIT SF MDM: CPT

## 2024-09-15 NOTE — DISCHARGE INSTRUCTIONS
Utilice georges compresa tibia sobre el área para ayudar a aliviar los síntomas.  Continúe amamantando y extrayendo leche para ayudar con la reducción y la ingurgitación mamaria. Regrese al departamento de emergencias si nota enrojecimiento e hinchazón en la piel a lo abby del área de los bultos.    (Please use a warm compress over the area to help with symptom relief.  Please continue breast-feeding and pumping to help with reduction and breast engorgement. Please return to the emergency department if you notice redness and swelling on the skin along the area of the lumps.)

## 2024-09-15 NOTE — ED PROVIDER NOTES
1. Engorged breasts      ED Disposition       ED Disposition   Discharge    Condition   Stable    Date/Time   Sun Sep 15, 2024  1:19 AM    Comment   Bartolo Santoyo discharge to home/self care.                   Assessment & Plan       Medical Decision Making  Patient presented with complaints of left lumps and tenderness, likely secondary breast engorgement secondary to lactational duct backup.  Based on history and physical exam, patient had evidence of inflammation of the breast tissue, erythematous region of the skin, no fluctuance, no other symptoms at this time.  Patient given instructions to continue breast-feeding, apply warm compresses for symptom relief, and follow-up with outpatient OB/GYN.  Patient discharged home with instructional material provided.                     Medications - No data to display    History of Present Illness       Pt is a 24 y.o. female with a PMH as documented below who presents to the ED on September 15, 2024 lateral left breast engorgement.  Collateral information also provided by partner and father of her child at bedside.  Patient states that she began having breast lumps starting at the postpartum period when she began lactation.  Patient still breast-feeding her 7-month-old child.  Patient states that she began noticing that after long periods of not breast-feeding or pumping that she began to have multiple lumps in her bilateral breasts that would spontaneously resolve after breast-feeding or pumping.  Starting this morning, patient states that she began having slight pain in the left breast with one of her lumps.  She then pumped and breast-fed and applied warm compress to the area which spontaneously resolved her pain.  Patient also states that she started feeling lightheaded after taking a hot shower which she attempted as a means of managing her left breast lump.  Patient denies any redness, swelling, erythema of the associated painful area.  Patient has no  purulent drainage from the skin or nipple.  No bleeding from the skin of the surrounding area or nipple.  Patient reported subjective fevers, lightheadedness.  Patient otherwise denies chills, chest pain, shortness of breath, abdominal pain, nausea, vomiting, urinary symptoms, vaginal bleeding, vaginal discharge, any other symptoms at this time.            Review of Systems   Constitutional:  Negative for activity change, appetite change, chills, fatigue and fever.   HENT:  Negative for ear pain and sore throat.    Eyes:  Negative for pain and visual disturbance.   Respiratory:  Negative for cough, chest tightness, shortness of breath and wheezing.    Cardiovascular:  Negative for chest pain, palpitations and leg swelling.   Gastrointestinal:  Negative for abdominal distention, abdominal pain, blood in stool, constipation, diarrhea, nausea and vomiting.   Genitourinary:  Negative for difficulty urinating, dyspareunia, dysuria, enuresis, flank pain, frequency, hematuria, vaginal bleeding and vaginal discharge.   Musculoskeletal:  Negative for arthralgias and back pain.   Skin:  Negative for color change, rash and wound.   Neurological:  Positive for light-headedness. Negative for dizziness, seizures, syncope, weakness, numbness and headaches.   All other systems reviewed and are negative.          Objective     ED Triage Vitals [09/15/24 0018]   Temperature Pulse Blood Pressure Respirations SpO2 Patient Position - Orthostatic VS   97.9 °F (36.6 °C) 86 106/58 18 97 % --      Temp Source Heart Rate Source BP Location FiO2 (%) Pain Score    Oral Monitor -- -- 5        Physical Exam  Vitals and nursing note reviewed. Exam conducted with a chaperone present.   Constitutional:       General: She is not in acute distress.     Appearance: She is well-developed.   HENT:      Head: Normocephalic and atraumatic.   Eyes:      Conjunctiva/sclera: Conjunctivae normal.   Cardiovascular:      Rate and Rhythm: Normal rate and regular  rhythm.      Heart sounds: No murmur heard.  Pulmonary:      Effort: Pulmonary effort is normal. No respiratory distress.      Breath sounds: Normal breath sounds.   Chest:   Breasts:     Right: Normal.      Left: No swelling, bleeding, inverted nipple, mass, nipple discharge, skin change or tenderness.   Abdominal:      Palpations: Abdomen is soft.      Tenderness: There is no abdominal tenderness.   Musculoskeletal:         General: No swelling.      Cervical back: Neck supple.   Skin:     General: Skin is warm and dry.      Capillary Refill: Capillary refill takes less than 2 seconds.   Neurological:      Mental Status: She is alert.   Psychiatric:         Mood and Affect: Mood normal.         Labs Reviewed - No data to display  No orders to display       Procedures         Iban Corrales MD  09/15/24 0135

## 2024-09-16 ENCOUNTER — HOSPITAL ENCOUNTER (OUTPATIENT)
Dept: ULTRASOUND IMAGING | Facility: HOSPITAL | Age: 24
Discharge: HOME | End: 2024-09-16
Payer: COMMERCIAL

## 2024-09-16 DIAGNOSIS — R10.30 LOWER ABDOMINAL PAIN: ICD-10-CM

## 2024-09-16 PROCEDURE — 76830 TRANSVAGINAL US NON-OB: CPT

## 2024-09-16 PROCEDURE — 76856 US EXAM PELVIC COMPLETE: CPT

## 2024-09-23 ENCOUNTER — ANNUAL EXAM (OUTPATIENT)
Dept: OBGYN CLINIC | Facility: CLINIC | Age: 24
End: 2024-09-23

## 2024-09-23 VITALS
HEART RATE: 66 BPM | DIASTOLIC BLOOD PRESSURE: 61 MMHG | BODY MASS INDEX: 18.12 KG/M2 | SYSTOLIC BLOOD PRESSURE: 94 MMHG | HEIGHT: 61 IN | WEIGHT: 96 LBS

## 2024-09-23 DIAGNOSIS — Z20.2 POSSIBLE EXPOSURE TO STD: ICD-10-CM

## 2024-09-23 DIAGNOSIS — N91.2 AMENORRHEA: ICD-10-CM

## 2024-09-23 DIAGNOSIS — Z01.411 ENCOUNTER FOR GYNECOLOGICAL EXAMINATION WITH ABNORMAL FINDING: Primary | ICD-10-CM

## 2024-09-23 PROBLEM — Z34.93 THIRD TRIMESTER PREGNANCY: Status: RESOLVED | Noted: 2023-07-11 | Resolved: 2024-09-23

## 2024-09-23 PROBLEM — Z3A.40 40 WEEKS GESTATION OF PREGNANCY: Status: RESOLVED | Noted: 2023-07-11 | Resolved: 2024-09-23

## 2024-09-23 PROCEDURE — 99395 PREV VISIT EST AGE 18-39: CPT | Performed by: NURSE PRACTITIONER

## 2024-09-23 PROCEDURE — 87491 CHLMYD TRACH DNA AMP PROBE: CPT | Performed by: NURSE PRACTITIONER

## 2024-09-23 PROCEDURE — 87591 N.GONORRHOEAE DNA AMP PROB: CPT | Performed by: NURSE PRACTITIONER

## 2024-09-23 NOTE — PROGRESS NOTES
"ASSESSMENT & PLAN: Bartolo Santoyo is a 24 y.o.  with abnormal gynecologic exam.  Amenorrhea, is nursing    1.  Routine well woman exam done today  2.  Pap:  The patient's last pap was 25.    It was normal.    Pap was not done today.    Current ASCCP Guidelines reviewed.   3.  STD testing  was done for chlamydia and gonorrhea only, declines STD blood work  4.  Gardasil recommendations reviewed  is vaccinated.  Did not receive rubella vaccine postpartum, recommend to get through her PCP  5. The following were reviewed in today's visit: breast self exam, STD testing, family planning choices, adequate intake of calcium and vitamin D, exercise, healthy diet, and patient declined contraception, partner may use condoms, pt does not desire pregnancy at this time  6.  Amenorrhea, but delivered only 7 months ago, and is nursing approximately every 3 hours.  Reviewed with patient once baby is eating more solids and she nurses less,  menses will resume.  Recommend for her to complete her blood work that was ordered by her PCP.  Has follow-up with her PCP concerning this in October. Continue  multivitamin with folic acid        CC:  Annual Gynecologic Examination    HPI: Bartolo Santoyo is a 24 y.o.  who presents for annual gynecologic examination.   She delivered 24 , baby girl \" Yailianys \". Pt had a PPH, received Venofer, had a second degree laceration.  Needs to complete CBC and labs for amenorrhea.  She has the following concerns: Was seen in ED 9/15/2024 for engorgement for left breast concern was having pain and lump but has resolved with heat and massage    Health Maintenance:    She wears her seatbelt routinely.    She does perform regular monthly self breast exams.    She feels safe at home.     History reviewed. No pertinent past medical history.    History reviewed. No pertinent surgical history.    OB/Gyn History:    Pt has menstrual issues.  Amenorrheic  History of sexually " transmitted infection: No.  History of abnormal pap smears: No .    Patient is currently sexually active.    The current method of family planning is none.    OB History          1    Para   1    Term   1            AB        Living   1         SAB        IAB        Ectopic        Multiple   0    Live Births   1                 Family History   Problem Relation Age of Onset    No Known Problems Mother     No Known Problems Father     No Known Problems Sister     No Known Problems Sister     No Known Problems Sister     No Known Problems Brother     No Known Problems Daughter     No Known Problems Maternal Grandmother     No Known Problems Maternal Grandfather     Cancer Paternal Grandmother     Breast cancer Neg Hx     Colon cancer Neg Hx     Ovarian cancer Neg Hx        Social History:  Social History     Socioeconomic History    Marital status: Single     Spouse name: Not on file    Number of children: Not on file    Years of education: 12    Highest education level: High school graduate   Occupational History    Not on file   Tobacco Use    Smoking status: Never     Passive exposure: Past    Smokeless tobacco: Never   Vaping Use    Vaping status: Never Used   Substance and Sexual Activity    Alcohol use: Not Currently    Drug use: Never    Sexual activity: Yes     Partners: Male     Birth control/protection: None   Other Topics Concern    Not on file   Social History Narrative    Not on file     Social Determinants of Health     Financial Resource Strain: Low Risk  (2024)    Overall Financial Resource Strain (CARDIA)     Difficulty of Paying Living Expenses: Not very hard   Food Insecurity: No Food Insecurity (4/3/2024)    Hunger Vital Sign     Worried About Running Out of Food in the Last Year: Never true     Ran Out of Food in the Last Year: Never true   Transportation Needs: No Transportation Needs (2024)    PRAPARE - Transportation     Lack of Transportation (Medical): No     Lack of  Transportation (Non-Medical): No   Physical Activity: Not on file   Stress: No Stress Concern Present (7/3/2023)    [employer] - Occupational Stress Questionnaire     Feeling of Stress : Only a little   Social Connections: Socially Isolated (7/3/2023)    Social Connection and Isolation Panel [NHANES]     Frequency of Communication with Friends and Family: Twice a week     Frequency of Social Gatherings with Friends and Family: Once a week     Attends Bahai Services: Never     Active Member of Clubs or Organizations: No     Attends Club or Organization Meetings: Never     Marital Status: Never    Intimate Partner Violence: Not At Risk (6/29/2023)    Humiliation, Afraid, Rape, and Kick questionnaire     Fear of Current or Ex-Partner: No     Emotionally Abused: No     Physically Abused: No     Sexually Abused: No   Housing Stability: Low Risk  (4/3/2024)    Housing Stability Vital Sign     Unable to Pay for Housing in the Last Year: No     Number of Times Moved in the Last Year: 1     Homeless in the Last Year: No     Patient is single.  Patient is currently employed     No Known Allergies      Current Outpatient Medications:     docusate sodium (COLACE) 100 mg capsule, Take 1 capsule (100 mg total) by mouth 2 (two) times a day (Patient not taking: Reported on 11/28/2023), Disp: 60 capsule, Rfl: 2    ferrous sulfate 324 (65 Fe) mg, Take 1 tablet (324 mg total) by mouth daily before breakfast (Patient not taking: Reported on 1/19/2024), Disp: 30 tablet, Rfl: 2    Review of Systems:  Constitutional :no fever, feels well, no tiredness, no recent weight gain or loss  ENT: no ear ache, no loss of hearing, no nosebleeds or nasal discharge, no sore throat or hoarseness.  Cardiovascular: no complaints of slow or fast heart beat, no chest pain, no palpitations, no leg claudication or lower extremity edema.  Respiratory: no complaints of shortness of shortness of breath, no  "VEGAS  Breasts:no complaints of breast pain, breast lump, or nipple discharge  Gastrointestinal: no complaints of abdominal pain, constipation, nausea, vomiting, or diarrhea or bloody stools  Genitourinary : no complaints of dysuria, incontinence, pelvic pain, no dysmenorrhea, vaginal discharge or abnormal vaginal bleeding and as noted in HPI.  Musculoskeletal: no complaints of arthralgia, no myalgia, no joint swelling or stiffness, no limb pain or swelling.  Integumentary: no complaints of skin rash or lesion, itching or dry skin  Neurological: no complaints of headache, no confusion, no numbness or tingling, no dizziness or fainting    Objective      BP 94/61   Pulse 66   Ht 5' 0.75\" (1.543 m)   Wt 43.5 kg (96 lb)   LMP  (LMP Unknown) Comment: breast feeding, not getting period yet  BMI 18.29 kg/m²     General:   appears stated age, cooperative, alert normal mood and affect   Neck: normal, supple,trachea midline, no masses   Heart: regular rate and rhythm, S1, S2 normal, no murmur, click, rub or gallop   Lungs: clear to auscultation bilaterally   Breasts: normal appearance, no masses or tenderness, Inspection negative, No nipple retraction or dimpling, No nipple discharge or bleeding, No axillary or supraclavicular adenopathy, Normal to palpation without dominant masses, Taught monthly breast self examination   Abdomen: soft, non-tender, without masses or organomegaly   Vulva: normal female genitalia   Vagina: normal vagina   Urethra: normal   Cervix: Normal, no discharge. GCC done. Nontender.   Uterus: normal size, contour, position, consistency, mobility, non-tender and mobile   Adnexa: normal adnexa and no mass, fullness, tenderness   Lymphatic palpation of lymph nodes in neck, axilla, groin and/or other locations: no lymphadenopathy or masses noted   Skin normal skin turgor and no rashes.   Psychiatric orientation to person, place, and time: normal. mood and affect: normal         "

## 2024-09-24 ENCOUNTER — TELEPHONE (OUTPATIENT)
Dept: OBGYN CLINIC | Facility: CLINIC | Age: 24
End: 2024-09-24

## 2024-09-24 LAB
C TRACH DNA SPEC QL NAA+PROBE: NEGATIVE
N GONORRHOEA DNA SPEC QL NAA+PROBE: NEGATIVE

## 2024-09-24 NOTE — TELEPHONE ENCOUNTER
----- Message from WANDY Hobbs sent at 9/24/2024  2:47 PM EDT -----  Please inform pt that her culture for chlamydia and gonorrhea were negative.  Thank You!

## 2024-09-26 NOTE — ED ATTENDING ATTESTATION
9/15/2024  I, Nitza Quezada DO, saw and evaluated the patient. I have discussed the patient with the resident/non-physician practitioner and agree with the resident's/non-physician practitioner's findings, Plan of Care, and MDM as documented in the resident's/non-physician practitioner's note, except where noted. All available labs and Radiology studies were reviewed.  I was present for key portions of any procedure(s) performed by the resident/non-physician practitioner and I was immediately available to provide assistance.       At this point I agree with the current assessment done in the Emergency Department.  I have conducted an independent evaluation of this patient a history and physical is as follows:    ED Course         Critical Care Time  Procedures

## 2024-10-01 ENCOUNTER — APPOINTMENT (OUTPATIENT)
Dept: LAB | Facility: CLINIC | Age: 24
End: 2024-10-01
Payer: COMMERCIAL

## 2024-10-01 DIAGNOSIS — N91.2 AMENORRHEA: ICD-10-CM

## 2024-10-01 DIAGNOSIS — T14.8XXA BRUISING: ICD-10-CM

## 2024-10-01 DIAGNOSIS — R10.30 LOWER ABDOMINAL PAIN: ICD-10-CM

## 2024-10-01 LAB
ALBUMIN SERPL BCG-MCNC: 4.3 G/DL (ref 3.5–5)
ALP SERPL-CCNC: 81 U/L (ref 34–104)
ALT SERPL W P-5'-P-CCNC: 12 U/L (ref 7–52)
ANION GAP SERPL CALCULATED.3IONS-SCNC: 8 MMOL/L (ref 4–13)
AST SERPL W P-5'-P-CCNC: 15 U/L (ref 13–39)
BACTERIA UR QL AUTO: ABNORMAL /HPF
BASOPHILS # BLD AUTO: 0.06 THOUSANDS/ΜL (ref 0–0.1)
BASOPHILS NFR BLD AUTO: 1 % (ref 0–1)
BILIRUB SERPL-MCNC: 0.57 MG/DL (ref 0.2–1)
BILIRUB UR QL STRIP: NEGATIVE
BUN SERPL-MCNC: 13 MG/DL (ref 5–25)
CALCIUM SERPL-MCNC: 9 MG/DL (ref 8.4–10.2)
CHLORIDE SERPL-SCNC: 103 MMOL/L (ref 96–108)
CLARITY UR: CLEAR
CO2 SERPL-SCNC: 29 MMOL/L (ref 21–32)
COLOR UR: ABNORMAL
CREAT SERPL-MCNC: 0.65 MG/DL (ref 0.6–1.3)
EOSINOPHIL # BLD AUTO: 0.14 THOUSAND/ΜL (ref 0–0.61)
EOSINOPHIL NFR BLD AUTO: 2 % (ref 0–6)
ERYTHROCYTE [DISTWIDTH] IN BLOOD BY AUTOMATED COUNT: 12.3 % (ref 11.6–15.1)
GFR SERPL CREATININE-BSD FRML MDRD: 126 ML/MIN/1.73SQ M
GLUCOSE P FAST SERPL-MCNC: 75 MG/DL (ref 65–99)
GLUCOSE UR STRIP-MCNC: NEGATIVE MG/DL
HCT VFR BLD AUTO: 41.4 % (ref 34.8–46.1)
HGB BLD-MCNC: 13.7 G/DL (ref 11.5–15.4)
HGB UR QL STRIP.AUTO: NEGATIVE
IMM GRANULOCYTES # BLD AUTO: 0.02 THOUSAND/UL (ref 0–0.2)
IMM GRANULOCYTES NFR BLD AUTO: 0 % (ref 0–2)
KETONES UR STRIP-MCNC: NEGATIVE MG/DL
LEUKOCYTE ESTERASE UR QL STRIP: ABNORMAL
LYMPHOCYTES # BLD AUTO: 2.91 THOUSANDS/ΜL (ref 0.6–4.47)
LYMPHOCYTES NFR BLD AUTO: 50 % (ref 14–44)
MCH RBC QN AUTO: 30.8 PG (ref 26.8–34.3)
MCHC RBC AUTO-ENTMCNC: 33.1 G/DL (ref 31.4–37.4)
MCV RBC AUTO: 93 FL (ref 82–98)
MONOCYTES # BLD AUTO: 0.48 THOUSAND/ΜL (ref 0.17–1.22)
MONOCYTES NFR BLD AUTO: 8 % (ref 4–12)
NEUTROPHILS # BLD AUTO: 2.31 THOUSANDS/ΜL (ref 1.85–7.62)
NEUTS SEG NFR BLD AUTO: 39 % (ref 43–75)
NITRITE UR QL STRIP: NEGATIVE
NON-SQ EPI CELLS URNS QL MICRO: ABNORMAL /HPF
NRBC BLD AUTO-RTO: 0 /100 WBCS
PH UR STRIP.AUTO: 6.5 [PH]
PLATELET # BLD AUTO: 251 THOUSANDS/UL (ref 149–390)
PMV BLD AUTO: 11.8 FL (ref 8.9–12.7)
POTASSIUM SERPL-SCNC: 3.8 MMOL/L (ref 3.5–5.3)
PROLACTIN SERPL-MCNC: 23.19 NG/ML (ref 3.34–26.72)
PROT SERPL-MCNC: 7.1 G/DL (ref 6.4–8.4)
PROT UR STRIP-MCNC: ABNORMAL MG/DL
RBC # BLD AUTO: 4.45 MILLION/UL (ref 3.81–5.12)
RBC #/AREA URNS AUTO: ABNORMAL /HPF
SODIUM SERPL-SCNC: 140 MMOL/L (ref 135–147)
SP GR UR STRIP.AUTO: 1.02 (ref 1–1.03)
TSH SERPL DL<=0.05 MIU/L-ACNC: 1.51 UIU/ML (ref 0.45–4.5)
UROBILINOGEN UR STRIP-ACNC: <2 MG/DL
WBC # BLD AUTO: 5.92 THOUSAND/UL (ref 4.31–10.16)
WBC #/AREA URNS AUTO: ABNORMAL /HPF

## 2024-10-01 PROCEDURE — 84146 ASSAY OF PROLACTIN: CPT

## 2024-10-01 PROCEDURE — 87086 URINE CULTURE/COLONY COUNT: CPT

## 2024-10-01 PROCEDURE — 85025 COMPLETE CBC W/AUTO DIFF WBC: CPT

## 2024-10-01 PROCEDURE — 81001 URINALYSIS AUTO W/SCOPE: CPT

## 2024-10-01 PROCEDURE — 36415 COLL VENOUS BLD VENIPUNCTURE: CPT

## 2024-10-01 PROCEDURE — 80053 COMPREHEN METABOLIC PANEL: CPT

## 2024-10-01 PROCEDURE — 84443 ASSAY THYROID STIM HORMONE: CPT

## 2024-10-02 LAB — BACTERIA UR CULT: NORMAL

## 2024-10-03 ENCOUNTER — TELEPHONE (OUTPATIENT)
Dept: FAMILY MEDICINE CLINIC | Facility: CLINIC | Age: 24
End: 2024-10-03

## 2024-10-03 NOTE — TELEPHONE ENCOUNTER
Called patient to discuss recent lab results. Discussed UA and at this time, patient has no urinary symptoms. All questions and concerns addressed to her satisfaction.